# Patient Record
Sex: FEMALE | Race: WHITE | NOT HISPANIC OR LATINO | Employment: STUDENT | URBAN - METROPOLITAN AREA
[De-identification: names, ages, dates, MRNs, and addresses within clinical notes are randomized per-mention and may not be internally consistent; named-entity substitution may affect disease eponyms.]

---
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
## 2024-02-05 ENCOUNTER — APPOINTMENT (OUTPATIENT)
Dept: FAMILY MEDICINE | Age: 19
End: 2024-02-05

## 2024-02-05 DIAGNOSIS — L70.0 CYSTIC ACNE: ICD-10-CM

## 2024-02-05 DIAGNOSIS — F90.0 ADHD, PREDOMINANTLY INATTENTIVE TYPE: ICD-10-CM

## 2024-02-05 DIAGNOSIS — F41.1 GENERALIZED ANXIETY DISORDER: Primary | ICD-10-CM

## 2024-02-05 PROCEDURE — 99213 OFFICE O/P EST LOW 20 MIN: CPT | Performed by: FAMILY MEDICINE

## 2024-02-05 RX ORDER — DEXTROAMPHETAMINE SACCHARATE, AMPHETAMINE ASPARTATE, DEXTROAMPHETAMINE SULFATE AND AMPHETAMINE SULFATE 5; 5; 5; 5 MG/1; MG/1; MG/1; MG/1
20 TABLET ORAL DAILY
Qty: 30 TABLET | Refills: 0 | Status: SHIPPED | OUTPATIENT
Start: 2024-02-05

## 2024-02-05 RX ORDER — CLASCOTERONE 1 G/100G
CREAM TOPICAL
Qty: 60 G | Refills: 1 | Status: SHIPPED | OUTPATIENT
Start: 2024-02-05

## 2024-02-05 ASSESSMENT — ENCOUNTER SYMPTOMS
POLYPHAGIA: 0
NAUSEA: 0
SORE THROAT: 0
STRIDOR: 0
COUGH: 0
FATIGUE: 0
WHEEZING: 0
DIAPHORESIS: 0
EYE DISCHARGE: 0
TROUBLE SWALLOWING: 0
CHILLS: 0
SHORTNESS OF BREATH: 0
CONSTIPATION: 0
DIARRHEA: 0
DIZZINESS: 0
EYE PAIN: 0
POLYDIPSIA: 0
BLOOD IN STOOL: 0
ABDOMINAL DISTENTION: 0
WEAKNESS: 0
ABDOMINAL PAIN: 0
VOMITING: 0
ACTIVITY CHANGE: 0
HEADACHES: 0
RHINORRHEA: 0
PSYCHIATRIC NEGATIVE: 1
FEVER: 0

## 2024-03-11 ENCOUNTER — APPOINTMENT (OUTPATIENT)
Dept: FAMILY MEDICINE | Age: 19
End: 2024-03-11

## 2024-03-11 DIAGNOSIS — F90.0 ADHD, PREDOMINANTLY INATTENTIVE TYPE: Primary | ICD-10-CM

## 2024-03-11 PROCEDURE — 99213 OFFICE O/P EST LOW 20 MIN: CPT | Performed by: FAMILY MEDICINE

## 2024-03-11 RX ORDER — DEXTROAMPHETAMINE SACCHARATE, AMPHETAMINE ASPARTATE, DEXTROAMPHETAMINE SULFATE AND AMPHETAMINE SULFATE 5; 5; 5; 5 MG/1; MG/1; MG/1; MG/1
20 TABLET ORAL 2 TIMES DAILY
Qty: 45 TABLET | Refills: 0 | Status: SHIPPED | OUTPATIENT
Start: 2024-03-11

## 2024-03-11 ASSESSMENT — ENCOUNTER SYMPTOMS
CHILLS: 0
CONSTIPATION: 0
NAUSEA: 0
FEVER: 0
FATIGUE: 0
VOMITING: 0
DIARRHEA: 0
RHINORRHEA: 0
COUGH: 0
HEADACHES: 0
BLOOD IN STOOL: 0
WHEEZING: 0
EYE DISCHARGE: 0
ACTIVITY CHANGE: 0
STRIDOR: 0
EYE PAIN: 0
SORE THROAT: 0
ABDOMINAL PAIN: 0
SHORTNESS OF BREATH: 0
POLYDIPSIA: 0
TROUBLE SWALLOWING: 0
DIAPHORESIS: 0
POLYPHAGIA: 0
WEAKNESS: 0
ABDOMINAL DISTENTION: 0
DIZZINESS: 0
PSYCHIATRIC NEGATIVE: 1

## 2024-04-09 ENCOUNTER — TELEPHONE (OUTPATIENT)
Dept: FAMILY MEDICINE | Age: 19
End: 2024-04-09

## 2024-04-09 DIAGNOSIS — F90.0 ADHD, PREDOMINANTLY INATTENTIVE TYPE: ICD-10-CM

## 2024-04-09 RX ORDER — DEXTROAMPHETAMINE SACCHARATE, AMPHETAMINE ASPARTATE, DEXTROAMPHETAMINE SULFATE AND AMPHETAMINE SULFATE 5; 5; 5; 5 MG/1; MG/1; MG/1; MG/1
20 TABLET ORAL 2 TIMES DAILY
Qty: 45 TABLET | Refills: 0 | Status: SHIPPED | OUTPATIENT
Start: 2024-04-09

## 2024-08-07 ENCOUNTER — OFFICE VISIT (OUTPATIENT)
Dept: ORTHOPEDIC SURGERY | Facility: CLINIC | Age: 19
End: 2024-08-07
Payer: COMMERCIAL

## 2024-08-07 ENCOUNTER — HOSPITAL ENCOUNTER (OUTPATIENT)
Dept: RADIOLOGY | Facility: CLINIC | Age: 19
Discharge: HOME | End: 2024-08-07
Payer: COMMERCIAL

## 2024-08-07 ENCOUNTER — OFFICE VISIT (OUTPATIENT)
Dept: ORTHOPEDIC SURGERY | Facility: HOSPITAL | Age: 19
End: 2024-08-07
Payer: COMMERCIAL

## 2024-08-07 VITALS — HEIGHT: 62 IN | WEIGHT: 155 LBS | BODY MASS INDEX: 28.52 KG/M2

## 2024-08-07 DIAGNOSIS — M25.551 RIGHT HIP PAIN: ICD-10-CM

## 2024-08-07 DIAGNOSIS — Q65.89 OTHER SPECIFIED CONGENITAL DEFORMITIES OF HIP (HHS-HCC): ICD-10-CM

## 2024-08-07 DIAGNOSIS — M25.851 FEMOROACETABULAR IMPINGEMENT OF RIGHT HIP: ICD-10-CM

## 2024-08-07 DIAGNOSIS — M24.151 DEGENERATIVE TEAR OF ACETABULAR LABRUM OF RIGHT HIP: ICD-10-CM

## 2024-08-07 DIAGNOSIS — Q65.89 OTHER SPECIFIED CONGENITAL DEFORMITIES OF HIP (HHS-HCC): Primary | ICD-10-CM

## 2024-08-07 DIAGNOSIS — Q65.89 FEMORAL ANTEVERSION OF RIGHT LOWER EXTREMITY (HHS-HCC): ICD-10-CM

## 2024-08-07 PROCEDURE — 73502 X-RAY EXAM HIP UNI 2-3 VIEWS: CPT | Mod: RT

## 2024-08-07 PROCEDURE — 73552 X-RAY EXAM OF FEMUR 2/>: CPT | Mod: RT

## 2024-08-07 PROCEDURE — 99214 OFFICE O/P EST MOD 30 MIN: CPT | Performed by: ORTHOPAEDIC SURGERY

## 2024-08-07 PROCEDURE — 99213 OFFICE O/P EST LOW 20 MIN: CPT | Performed by: ORTHOPAEDIC SURGERY

## 2024-08-07 PROCEDURE — 72190 X-RAY EXAM OF PELVIS: CPT

## 2024-08-07 PROCEDURE — 3008F BODY MASS INDEX DOCD: CPT | Performed by: ORTHOPAEDIC SURGERY

## 2024-08-07 RX ORDER — DEXTROAMPHETAMINE SACCHARATE, AMPHETAMINE ASPARTATE, DEXTROAMPHETAMINE SULFATE AND AMPHETAMINE SULFATE 5; 5; 5; 5 MG/1; MG/1; MG/1; MG/1
30 TABLET ORAL
COMMUNITY
Start: 2024-05-07 | End: 2024-08-08

## 2024-08-07 RX ORDER — CEFUROXIME AXETIL 250 MG/1
6 TABLET ORAL
COMMUNITY
Start: 2024-03-29

## 2024-08-07 RX ORDER — ERENUMAB-AOOE 140 MG/ML
140 INJECTION, SOLUTION SUBCUTANEOUS
COMMUNITY
Start: 2024-03-29

## 2024-08-07 ASSESSMENT — PAIN - FUNCTIONAL ASSESSMENT: PAIN_FUNCTIONAL_ASSESSMENT: NO/DENIES PAIN

## 2024-08-12 NOTE — PROGRESS NOTES
Patient returns to see me today in follow-up of her hip.  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  She has had now by her account at least 3 intra-articular injections but continues to have significant debilitating pain she is also done extensive physical therapy she is interested in proceeding with operative intervention in the form of a derotational osteotomy of the femur combined with a hip arthroscopy to treat her labral tear and loose body as well as impingement.    Patient has exhausted conservative management including therapeutic exercises, activity modification, NSAIDS.  They continue to have worsening deep groin pain with mechanical symptoms affecting ADLs.  Patient would like to proceed with surgery entailing a hip arthroscopy, acetabuloplasty for acetabular retroversion, labral repair, femoral osteoplasty, loose body removal for possible cartilaginous loose body seen on MRI, and capsular plication for mild hip instability.    We discussed the risks and benefits of surgery which included but were not limited to bleeding, infection, damage to nerves, damage to blood vessels, need for further procedures, risks of anesthesia, heterotopic ossification, femoral neck stress fracture, avascular necrosis of the femoral head, pudendal nerve palsy iatrogenic instability progression of osteoarthritis.    Patient was prescribed a hinged hip brace for KENDALL/labral tear.  The patient is ambulatory with or without aid; but, has weakness, instability and/or deformity of their right hip which requires stabilization from this orthosis to improve their function.      Verbal and written instructions for the use, wear schedule, cleaning and application of this item were given.  Patient was instructed that should the brace result in increased pain, decreased sensation, increased swelling, or an overall  worsening of their medical condition, to please contact our office immediately.     Patient was prescribed crutches for KENDALL/labral tear.  This mobility device is required for the following reasons:    1. The patient has a mobility limitation that significantly impairs their ability to participate in one or more mobility-related activities of daily living (MRADL) in the home; and  2. The patient is able to safely use the mobility device; and  3. The functional mobility deficit can be sufficiently resolved with use of the mobility device    Verbal and written instructions for the use, wear schedule, cleaning and application of this item were given.  Education provided also included gait training and safety precautions when using this device. Patient was instructed that should the item result in increased pain, decreased sensation, increased swelling, or an overall worsening of their medical condition, to please contact our office immediately.    Orthotic management and training was provided for skin care, modifications due to healing tissues, edema changes, interruption in skin integrity, and safety precautions with the orthosis.

## 2024-08-12 NOTE — PROGRESS NOTES
19-year-old female who I last saw about 2-1/2 years ago.  She presents with her mother today.  She has had persistent right sided pain since I saw her last that was initially tolerable but as her activities have increased in college as she is still  she has had progressive pain that has not abated fully.  It is now affecting her daily living and daily activities.  She has pain not only with activity but also now with rest.  She was previously discussed to have a derotational femoral osteotomy and hip arthroscopy combination procedure on the right side for increased femoral anteversion and KENDALL.  She continues to have mostly groin pain that sometimes radiates posteriorly.    The patients full medical history, surgical history, medications, allergies, family, medical history, social history, and a complete 30 point review of systems is documented in the medical record on the signed, scanned medical intake sheet or reviewed in the history of present illness.    Gen: The patient is alert and oriented ×3, is in no acute distress, and appear their stated age and weight.    Psychiatric: Mood and affect are appropriate.    Eyes: Sclera are white, and pupils are round and symmetric.    ENT: Mucous membranes are moist.     Neck: Supple. Thyroid is midline.    Respiratory: Respirations are nonlabored, chest rise is symmetric.    Cardiac: Rate is regular by palpation of distal pulses.     Abdomen: Nondistended.    Integument: No obvious cutaneous lesions are noted. No signs of lymphangitis. No signs of systemic edema.    Gait and stance examination demonstrate a reciprocal heel toe gait. Trendelenburg sign is negative.    Right hip examination reveals 120 degrees of flexion, 20 degrees of internal rotation, 20 degrees of external rotation, and 50 degrees of abduction. Anterior impingement sign is positive.  Posterior impingement sign is negative. Subspine impingement sign is negative. DWAIN test is negative.  Stinchfield test is positive. Vidhi test is negative. There is no tenderness to palpation over the pubic symphysis, anterior groin, greater trochanter, or gluteal region. Posterior apprehension is negative. Anterior apprehension is negative. Hip flexion strength is 5 out of 5. Adductor strength is 5 out of 5. Abduction strength is 5 out of 5 in the lateral position. Pelvic obliquity is level.    Distally, ankle dorsiflexion and plantarflexion as well as great toe extension is 5 out of 5. Sensation is intact to light touch in the tibial, sural, saphenous, superficial peroneal, and deep peroneal nerve distributions. The foot is warm and well-perfused with palpable pedal pulses. There is no obvious edema present. Skin is warm, dry and intact.    19-year-old female with increased femoral anteversion and femoral acetabular impingement on the right hip likely with labral tear.  She has not had any updated advanced imaging.  I will order a right sided CT scan of her femur as well as a right hip MRI.  She will have to get this outsourced at [facility] due to her parents insurance.  She will work on getting approval for that, I been in contact with her father regarding this.  Once these tests are done we should see her back in the office to review them and discuss degree of correction but she would be a candidate for likely a 20 degree derotational femoral osteotomy external rotation and hip arthroscopy.  She will also see Dr. Mcneal today to confirm this.  We will discuss further following the completion of her advanced imaging.    Natural History reviewed. All questions answered. The patient was in agreement with the plan.      **This note was created using voice recognition software and was not corrected for typographical or grammatical errors.**

## 2024-09-16 ENCOUNTER — TELEPHONE (OUTPATIENT)
Dept: FAMILY MEDICINE | Age: 19
End: 2024-09-16

## 2024-09-16 DIAGNOSIS — F90.0 ADHD, PREDOMINANTLY INATTENTIVE TYPE: ICD-10-CM

## 2024-09-16 DIAGNOSIS — L70.0 CYSTIC ACNE: ICD-10-CM

## 2024-09-16 DIAGNOSIS — J45.30 MILD PERSISTENT ASTHMA WITHOUT COMPLICATION (CMD): Primary | ICD-10-CM

## 2024-09-16 RX ORDER — ADAPALENE 0.1 G/100G
CREAM TOPICAL NIGHTLY
Qty: 45 G | Refills: 5 | Status: SHIPPED | OUTPATIENT
Start: 2024-09-16

## 2024-09-16 RX ORDER — LEVALBUTEROL TARTRATE 45 UG/1
1-2 AEROSOL, METERED ORAL EVERY 4 HOURS PRN
Qty: 1 EACH | Refills: 5 | Status: SHIPPED | OUTPATIENT
Start: 2024-09-16

## 2024-09-16 RX ORDER — DEXTROAMPHETAMINE SACCHARATE, AMPHETAMINE ASPARTATE, DEXTROAMPHETAMINE SULFATE AND AMPHETAMINE SULFATE 5; 5; 5; 5 MG/1; MG/1; MG/1; MG/1
20 TABLET ORAL 2 TIMES DAILY
Qty: 45 TABLET | Refills: 0 | Status: SHIPPED | OUTPATIENT
Start: 2024-09-16

## 2024-09-16 RX ORDER — CLASCOTERONE 1 G/100G
CREAM TOPICAL
Qty: 60 G | Refills: 5 | Status: SHIPPED | OUTPATIENT
Start: 2024-09-16

## 2024-09-19 ENCOUNTER — V-VISIT (OUTPATIENT)
Dept: FAMILY MEDICINE | Age: 19
End: 2024-09-19

## 2024-09-19 DIAGNOSIS — L70.0 CYSTIC ACNE: ICD-10-CM

## 2024-09-19 DIAGNOSIS — F41.1 GENERALIZED ANXIETY DISORDER: Primary | ICD-10-CM

## 2024-09-19 DIAGNOSIS — F90.0 ADHD, PREDOMINANTLY INATTENTIVE TYPE: ICD-10-CM

## 2024-09-19 PROCEDURE — 99213 OFFICE O/P EST LOW 20 MIN: CPT | Performed by: FAMILY MEDICINE

## 2024-09-19 SDOH — ECONOMIC STABILITY: GENERAL: WOULD YOU LIKE HELP WITH ANY OF THE FOLLOWING NEEDS?: I DON'T WANT HELP WITH ANY OF THESE

## 2024-09-19 SDOH — ECONOMIC STABILITY: TRANSPORTATION INSECURITY
IN THE PAST 12 MONTHS, HAS LACK OF RELIABLE TRANSPORTATION KEPT YOU FROM MEDICAL APPOINTMENTS, MEETINGS, WORK OR FROM GETTING THINGS NEEDED FOR DAILY LIVING?: NO

## 2024-09-19 SDOH — ECONOMIC STABILITY: HOUSING INSECURITY: DO YOU HAVE PROBLEMS WITH ANY OF THE FOLLOWING?: NONE OF THE ABOVE

## 2024-09-19 SDOH — ECONOMIC STABILITY: FOOD INSECURITY: WITHIN THE PAST 12 MONTHS, THE FOOD YOU BOUGHT JUST DIDN'T LAST AND YOU DIDN'T HAVE MONEY TO GET MORE.: NEVER TRUE

## 2024-09-19 SDOH — ECONOMIC STABILITY: HOUSING INSECURITY: WHAT IS YOUR LIVING SITUATION TODAY?: I HAVE A STEADY PLACE TO LIVE

## 2024-09-19 ASSESSMENT — ENCOUNTER SYMPTOMS
ABDOMINAL DISTENTION: 0
EYE PAIN: 0
WEAKNESS: 0
EYE DISCHARGE: 0
SHORTNESS OF BREATH: 0
VOMITING: 0
RHINORRHEA: 0
ACTIVITY CHANGE: 0
NAUSEA: 0
FATIGUE: 0
TROUBLE SWALLOWING: 0
FEVER: 0
POLYDIPSIA: 0
DIAPHORESIS: 0
CONSTIPATION: 0
SORE THROAT: 0
WHEEZING: 0
COUGH: 0
HEADACHES: 0
STRIDOR: 0
DIARRHEA: 0
PSYCHIATRIC NEGATIVE: 1
CHILLS: 0
ABDOMINAL PAIN: 0
BLOOD IN STOOL: 0
DIZZINESS: 0
POLYPHAGIA: 0

## 2024-09-19 ASSESSMENT — SOCIAL DETERMINANTS OF HEALTH (SDOH): IN THE PAST 12 MONTHS, HAS THE ELECTRIC, GAS, OIL, OR WATER COMPANY THREATENED TO SHUT OFF SERVICE IN YOUR HOME?: NO

## 2024-10-03 ENCOUNTER — TELEPHONE (OUTPATIENT)
Dept: ORTHOPEDIC SURGERY | Facility: CLINIC | Age: 19
End: 2024-10-03
Payer: COMMERCIAL

## 2024-10-23 ENCOUNTER — TELEPHONE (OUTPATIENT)
Dept: FAMILY MEDICINE | Age: 19
End: 2024-10-23

## 2024-10-23 DIAGNOSIS — F90.0 ADHD, PREDOMINANTLY INATTENTIVE TYPE: ICD-10-CM

## 2024-10-23 RX ORDER — DEXTROAMPHETAMINE SACCHARATE, AMPHETAMINE ASPARTATE, DEXTROAMPHETAMINE SULFATE AND AMPHETAMINE SULFATE 5; 5; 5; 5 MG/1; MG/1; MG/1; MG/1
20 TABLET ORAL 2 TIMES DAILY
Qty: 45 TABLET | Refills: 0 | Status: SHIPPED | OUTPATIENT
Start: 2024-10-23

## 2024-11-27 ENCOUNTER — OFFICE VISIT (OUTPATIENT)
Dept: ORTHOPEDIC SURGERY | Facility: CLINIC | Age: 19
End: 2024-11-27
Payer: COMMERCIAL

## 2024-11-27 ENCOUNTER — HOSPITAL ENCOUNTER (OUTPATIENT)
Dept: RADIOLOGY | Facility: CLINIC | Age: 19
Discharge: HOME | End: 2024-11-27
Payer: COMMERCIAL

## 2024-11-27 DIAGNOSIS — Q65.89 OTHER SPECIFIED CONGENITAL DEFORMITIES OF HIP: Primary | ICD-10-CM

## 2024-11-27 DIAGNOSIS — Q65.89 OTHER SPECIFIED CONGENITAL DEFORMITIES OF HIP: ICD-10-CM

## 2024-11-27 PROCEDURE — 99214 OFFICE O/P EST MOD 30 MIN: CPT | Performed by: ORTHOPAEDIC SURGERY

## 2024-11-27 ASSESSMENT — PAIN - FUNCTIONAL ASSESSMENT: PAIN_FUNCTIONAL_ASSESSMENT: NO/DENIES PAIN

## 2024-11-30 NOTE — PROGRESS NOTES
19-year-old female presenting in follow-up who presents with her parents.  I saw her last in August.  She also saw Dr. Mcneal at that point.  She is presenting today for follow-up regarding new imaging in terms of CT scan of her femur and MRI of her hip.  She has a known diagnosis of increased femoral anteversion.  She continues to have groin pain.  She is living in [place] at the moment for school.  Pain is only really exacerbated by profound activity modification sometimes for days.  She continues to have pain with activity and prolonged walking.    The patient does not endorse fevers and chills. The patient does not endorse any change in her vision or hearing. They do not endorse chest pain, shortness of breath. The patient does not endorse any abdominal discomfort. They do not endorse any skin irritation or lesions. They do not endorse any new numbness and tingling or as otherwise stated in the history of present illness.    She is in no acute distress, alert and oriented x 3.    Mood and affect are appropriate.    Respirations are unlabored.    Distal limb is pink and well perfused.    Right lower extremity evaluation demonstrates 60 to 70 degrees of internal rotation and 20 degrees of external rotation.  She has pain with stressing of her anterior capsule anteriorly.  She has anterior impingement test positivity.  Sensation is intact to light touch in the tibial, sural, saphenous, superficial peroneal, and deep peroneal nerve distributions. Foot is warm and well-perfused.    CT scan demonstrates 45 degrees of femoral anteversion on the right.  MRI demonstrates a labral tear.    19-year-old female who be a good candidate for a combined femoral derotation osteotomy as well as hip arthroscopy.  Will plan for surgery on 5/12/2025 at [facility].  We had a detailed discussion regarding the full details of the procedure, the proposed surgical plan, the cutting of the femur and derotating it using  external fixator assistance. We discussed combined arthroscopic and open approach with Dr. Mcneal for decompression of her femoral head with osteochondroplasty and repair of her labrum. We discussed regional anesthesia block combined with general anesthesia. We discussed use of hypotensive anesthesia to minimize blood loss but that blood allogenic blood transfusion is still a possibility. We discussed 2-3 day inpatient hospital stay. Although a traditional hip arthroscopy would be done outpatient, the complex level of hospital and nursing care needed would necessitate an inpatient stay, including frequent vital checks, monitoring and treatment of hypotension and anemia, IV pain medication, medical co-management, and safety education of ADLs while on crutches with limited weight bearing.  We also discussed weightbearing and crutch use based on the limitations of the arthroscopy and not be osteotomy. We discussed that patient will need a wheelchair for short distances possibly for up to 3 months and long distances up to 6 months depending on fatigue and recovery level. The natural history of dysplasia as well as details surrounding the procedure and postoperative recovery course were discussed with the patient and family present.  Nonoperative and operative treatment options were presented to the patient. After discussion, operative treatment was elected. Risks and benefits of surgery were discussed with the patient which include, but are not limited to, death, infection, bleeding, neurologic damage, nonunion, malunion, posttraumatic arthritis, incomplete resolution of symptoms, failure of the operation, and others. The patient understood and elected to proceed.    Natural History reviewed. All questions answered. The patient was in agreement with the plan.      **This note was created using voice recognition software and was not corrected for typographical or grammatical errors.**

## 2024-12-03 DIAGNOSIS — M25.051 HEMARTHROSIS OF RIGHT HIP: ICD-10-CM

## 2024-12-03 DIAGNOSIS — M25.851 FEMOROACETABULAR IMPINGEMENT OF RIGHT HIP: ICD-10-CM

## 2024-12-03 DIAGNOSIS — S73.191A TEAR OF RIGHT ACETABULAR LABRUM, INITIAL ENCOUNTER: ICD-10-CM

## 2024-12-03 DIAGNOSIS — Q65.89 CONGENITAL HIP DYSPLASIA (HHS-HCC): ICD-10-CM

## 2024-12-05 ENCOUNTER — TELEPHONE (OUTPATIENT)
Dept: FAMILY MEDICINE | Age: 19
End: 2024-12-05

## 2024-12-05 DIAGNOSIS — F90.0 ADHD, PREDOMINANTLY INATTENTIVE TYPE: ICD-10-CM

## 2024-12-05 RX ORDER — DEXTROAMPHETAMINE SACCHARATE, AMPHETAMINE ASPARTATE, DEXTROAMPHETAMINE SULFATE AND AMPHETAMINE SULFATE 5; 5; 5; 5 MG/1; MG/1; MG/1; MG/1
20 TABLET ORAL 2 TIMES DAILY
Qty: 45 TABLET | Refills: 0 | Status: SHIPPED | OUTPATIENT
Start: 2024-12-05

## 2025-01-08 ENCOUNTER — OFFICE VISIT (OUTPATIENT)
Dept: ORTHOPEDIC SURGERY | Facility: HOSPITAL | Age: 20
End: 2025-01-08
Payer: COMMERCIAL

## 2025-01-08 DIAGNOSIS — M25.851 FEMOROACETABULAR IMPINGEMENT OF RIGHT HIP: ICD-10-CM

## 2025-01-08 DIAGNOSIS — S73.191A ACETABULAR LABRUM TEAR, RIGHT, INITIAL ENCOUNTER: ICD-10-CM

## 2025-01-08 DIAGNOSIS — Q65.89 FEMORAL ANTEVERSION OF RIGHT LOWER EXTREMITY (HHS-HCC): ICD-10-CM

## 2025-01-08 PROCEDURE — 99213 OFFICE O/P EST LOW 20 MIN: CPT | Performed by: ORTHOPAEDIC SURGERY

## 2025-01-08 NOTE — PROGRESS NOTES
Patient returns today in follow-up of her hip.  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  She has had now by her account at least 3 intra-articular injections but continues to have significant debilitating pain she is also done extensive physical therapy she is interested in proceeding with operative intervention in the form of a derotational osteotomy of the femur combined with a hip arthroscopy to treat her labral tear and loose body as well as impingement. She is here with her mother today. She has not done any recent PT.     We will get her a new script for PT.     This surgery will be out of network for the patient. They are working on a waiver from [employer].   If out of network, we will only bill [id number] and [id number].    Patient has exhausted conservative management including therapeutic exercises, activity modification, NSAIDS.  They continue to have worsening deep groin pain with mechanical symptoms affecting ADLs.  Patient would like to proceed with surgery entailing a hip arthroscopy, acetabuloplasty for acetabular retroversion, labral repair, femoral osteoplasty, loose body removal for possible cartilaginous loose body seen on MRI, and capsular plication for mild hip instability.    We discussed the risks and benefits of surgery which included but were not limited to bleeding, infection, damage to nerves, damage to blood vessels, need for further procedures, risks of anesthesia, heterotopic ossification, femoral neck stress fracture, avascular necrosis of the femoral head, pudendal nerve palsy iatrogenic instability progression of osteoarthritis.    Patient was prescribed a hinged hip brace for KENDALL/labral tear.  The patient is ambulatory with or without aid; but, has weakness, instability and/or deformity of their right hip which requires stabilization from this orthosis to improve their  function.      Verbal and written instructions for the use, wear schedule, cleaning and application of this item were given.  Patient was instructed that should the brace result in increased pain, decreased sensation, increased swelling, or an overall worsening of their medical condition, to please contact our office immediately.     Patient was prescribed crutches for KENDALL/labral tear.  This mobility device is required for the following reasons:    1. The patient has a mobility limitation that significantly impairs their ability to participate in one or more mobility-related activities of daily living (MRADL) in the home; and  2. The patient is able to safely use the mobility device; and  3. The functional mobility deficit can be sufficiently resolved with use of the mobility device    Verbal and written instructions for the use, wear schedule, cleaning and application of this item were given.  Education provided also included gait training and safety precautions when using this device. Patient was instructed that should the item result in increased pain, decreased sensation, increased swelling, or an overall worsening of their medical condition, to please contact our office immediately.    Orthotic management and training was provided for skin care, modifications due to healing tissues, edema changes, interruption in skin integrity, and safety precautions with the orthosis.         Antonio Herbert MD  Sports Medicine Fellow  Orthopaedic Surgery

## 2025-01-14 ENCOUNTER — TELEPHONE (OUTPATIENT)
Dept: FAMILY MEDICINE | Age: 20
End: 2025-01-14

## 2025-01-14 ENCOUNTER — PATIENT MESSAGE (OUTPATIENT)
Dept: ORTHOPEDIC SURGERY | Facility: CLINIC | Age: 20
End: 2025-01-14
Payer: COMMERCIAL

## 2025-01-14 DIAGNOSIS — F90.0 ADHD, PREDOMINANTLY INATTENTIVE TYPE: ICD-10-CM

## 2025-01-14 RX ORDER — DEXTROAMPHETAMINE SACCHARATE, AMPHETAMINE ASPARTATE, DEXTROAMPHETAMINE SULFATE AND AMPHETAMINE SULFATE 5; 5; 5; 5 MG/1; MG/1; MG/1; MG/1
20 TABLET ORAL 2 TIMES DAILY
Qty: 45 TABLET | Refills: 0 | Status: SHIPPED | OUTPATIENT
Start: 2025-01-14

## 2025-02-24 ENCOUNTER — TELEPHONE (OUTPATIENT)
Dept: FAMILY MEDICINE | Age: 20
End: 2025-02-24

## 2025-02-24 DIAGNOSIS — F90.0 ADHD, PREDOMINANTLY INATTENTIVE TYPE: ICD-10-CM

## 2025-02-24 DIAGNOSIS — N92.1 MENORRHAGIA WITH IRREGULAR CYCLE: Primary | ICD-10-CM

## 2025-02-24 RX ORDER — DEXTROAMPHETAMINE SACCHARATE, AMPHETAMINE ASPARTATE, DEXTROAMPHETAMINE SULFATE AND AMPHETAMINE SULFATE 5; 5; 5; 5 MG/1; MG/1; MG/1; MG/1
20 TABLET ORAL 2 TIMES DAILY
Qty: 45 TABLET | Refills: 0 | Status: SHIPPED | OUTPATIENT
Start: 2025-02-24

## 2025-02-25 ENCOUNTER — LAB SERVICES (OUTPATIENT)
Dept: LAB | Age: 20
End: 2025-02-25

## 2025-02-25 DIAGNOSIS — N92.1 MENORRHAGIA WITH IRREGULAR CYCLE: ICD-10-CM

## 2025-02-25 PROCEDURE — 85025 COMPLETE CBC W/AUTO DIFF WBC: CPT | Performed by: CLINICAL MEDICAL LABORATORY

## 2025-02-25 PROCEDURE — 36415 COLL VENOUS BLD VENIPUNCTURE: CPT | Performed by: FAMILY MEDICINE

## 2025-02-25 PROCEDURE — 84443 ASSAY THYROID STIM HORMONE: CPT | Performed by: CLINICAL MEDICAL LABORATORY

## 2025-02-26 LAB
BASOPHILS # BLD: 0 K/MCL (ref 0–0.3)
BASOPHILS NFR BLD: 0 %
DEPRECATED RDW RBC: 39.9 FL (ref 39–50)
EOSINOPHIL # BLD: 0.1 K/MCL (ref 0–0.5)
EOSINOPHIL NFR BLD: 1 %
ERYTHROCYTE [DISTWIDTH] IN BLOOD: 12.2 % (ref 11–15)
HCT VFR BLD CALC: 42.5 % (ref 36–46.5)
HGB BLD-MCNC: 14.5 G/DL (ref 12–15.5)
IMM GRANULOCYTES # BLD AUTO: 0 K/MCL (ref 0–0.2)
IMM GRANULOCYTES # BLD: 0 %
LYMPHOCYTES # BLD: 1.9 K/MCL (ref 1.2–5.2)
LYMPHOCYTES NFR BLD: 22 %
MCH RBC QN AUTO: 30.2 PG (ref 26–34)
MCHC RBC AUTO-ENTMCNC: 34.1 G/DL (ref 32–36.5)
MCV RBC AUTO: 88.5 FL (ref 78–100)
MONOCYTES # BLD: 0.7 K/MCL (ref 0.3–0.9)
MONOCYTES NFR BLD: 7 %
NEUTROPHILS # BLD: 6.3 K/MCL (ref 1.8–8)
NEUTROPHILS NFR BLD: 70 %
NRBC BLD MANUAL-RTO: 0 /100 WBC
PLATELET # BLD AUTO: 309 K/MCL (ref 140–450)
RBC # BLD: 4.8 MIL/MCL (ref 4–5.2)
TSH SERPL-ACNC: 0.97 MCUNITS/ML (ref 0.46–4.13)
WBC # BLD: 9 K/MCL (ref 4.2–11)

## 2025-03-20 ENCOUNTER — TELEPHONE (OUTPATIENT)
Dept: FAMILY MEDICINE | Age: 20
End: 2025-03-20

## 2025-04-04 ENCOUNTER — E-ADVICE (OUTPATIENT)
Dept: FAMILY MEDICINE | Age: 20
End: 2025-04-04

## 2025-04-22 ENCOUNTER — ANESTHESIA EVENT (OUTPATIENT)
Dept: OPERATING ROOM | Facility: HOSPITAL | Age: 20
DRG: 481 | End: 2025-04-22
Payer: COMMERCIAL

## 2025-04-22 DIAGNOSIS — F90.0 ADHD, PREDOMINANTLY INATTENTIVE TYPE: ICD-10-CM

## 2025-04-22 RX ORDER — DEXTROAMPHETAMINE SACCHARATE, AMPHETAMINE ASPARTATE, DEXTROAMPHETAMINE SULFATE AND AMPHETAMINE SULFATE 5; 5; 5; 5 MG/1; MG/1; MG/1; MG/1
20 TABLET ORAL 2 TIMES DAILY
Qty: 45 TABLET | Refills: 0 | Status: SHIPPED | OUTPATIENT
Start: 2025-04-22

## 2025-04-23 DIAGNOSIS — M25.851 FEMOROACETABULAR IMPINGEMENT OF RIGHT HIP: ICD-10-CM

## 2025-04-23 DIAGNOSIS — Q65.89 OTHER SPECIFIED CONGENITAL DEFORMITIES OF HIP: ICD-10-CM

## 2025-04-23 DIAGNOSIS — S73.191A ACETABULAR LABRUM TEAR, RIGHT, INITIAL ENCOUNTER: ICD-10-CM

## 2025-04-23 DIAGNOSIS — Q65.89 FEMORAL ANTEVERSION OF RIGHT LOWER EXTREMITY: ICD-10-CM

## 2025-05-05 ENCOUNTER — LAB REQUISITION (OUTPATIENT)
Dept: LAB | Facility: HOSPITAL | Age: 20
End: 2025-05-05
Payer: COMMERCIAL

## 2025-05-05 ENCOUNTER — LAB (OUTPATIENT)
Dept: LAB | Facility: HOSPITAL | Age: 20
End: 2025-05-05
Payer: COMMERCIAL

## 2025-05-05 ENCOUNTER — PRE-ADMISSION TESTING (OUTPATIENT)
Dept: PREADMISSION TESTING | Facility: HOSPITAL | Age: 20
End: 2025-05-05
Payer: COMMERCIAL

## 2025-05-05 VITALS
HEART RATE: 80 BPM | TEMPERATURE: 98.8 F | BODY MASS INDEX: 26.21 KG/M2 | OXYGEN SATURATION: 99 % | DIASTOLIC BLOOD PRESSURE: 72 MMHG | RESPIRATION RATE: 16 BRPM | SYSTOLIC BLOOD PRESSURE: 114 MMHG | WEIGHT: 142.42 LBS | HEIGHT: 62 IN

## 2025-05-05 DIAGNOSIS — Z01.818 ENCOUNTER FOR OTHER PREPROCEDURAL EXAMINATION: Primary | ICD-10-CM

## 2025-05-05 DIAGNOSIS — Z01.818 PREOPERATIVE TESTING: Primary | ICD-10-CM

## 2025-05-05 DIAGNOSIS — Z01.818 ENCOUNTER FOR OTHER PREPROCEDURAL EXAMINATION: ICD-10-CM

## 2025-05-05 DIAGNOSIS — Q65.89 CONGENITAL HIP DYSPLASIA (HHS-HCC): ICD-10-CM

## 2025-05-05 LAB
ABO GROUP (TYPE) IN BLOOD: NORMAL
ABO GROUP (TYPE) IN BLOOD: NORMAL
ALBUMIN SERPL BCP-MCNC: 4.5 G/DL (ref 3.4–5)
ALP SERPL-CCNC: 57 U/L (ref 33–110)
ALT SERPL W P-5'-P-CCNC: 6 U/L (ref 7–45)
ANION GAP SERPL CALC-SCNC: 12 MMOL/L (ref 10–20)
ANTIBODY SCREEN: NORMAL
AST SERPL W P-5'-P-CCNC: 13 U/L (ref 9–39)
BASOPHILS # BLD AUTO: 0.03 X10*3/UL (ref 0–0.1)
BASOPHILS NFR BLD AUTO: 0.4 %
BILIRUB SERPL-MCNC: 0.6 MG/DL (ref 0–1.2)
BUN SERPL-MCNC: 17 MG/DL (ref 6–23)
CALCIUM SERPL-MCNC: 9.7 MG/DL (ref 8.6–10.3)
CHLORIDE SERPL-SCNC: 103 MMOL/L (ref 98–107)
CO2 SERPL-SCNC: 28 MMOL/L (ref 21–32)
CREAT SERPL-MCNC: 0.99 MG/DL (ref 0.5–1.05)
EGFRCR SERPLBLD CKD-EPI 2021: 83 ML/MIN/1.73M*2
EOSINOPHIL # BLD AUTO: 0.19 X10*3/UL (ref 0–0.7)
EOSINOPHIL NFR BLD AUTO: 2.2 %
ERYTHROCYTE [DISTWIDTH] IN BLOOD BY AUTOMATED COUNT: 11.9 % (ref 11.5–14.5)
GLUCOSE SERPL-MCNC: 66 MG/DL (ref 74–99)
HCT VFR BLD AUTO: 40.7 % (ref 36–46)
HGB BLD-MCNC: 13.7 G/DL (ref 12–16)
IMM GRANULOCYTES # BLD AUTO: 0.02 X10*3/UL (ref 0–0.7)
IMM GRANULOCYTES NFR BLD AUTO: 0.2 % (ref 0–0.9)
LYMPHOCYTES # BLD AUTO: 1.97 X10*3/UL (ref 1.2–4.8)
LYMPHOCYTES NFR BLD AUTO: 23.2 %
MCH RBC QN AUTO: 29.7 PG (ref 26–34)
MCHC RBC AUTO-ENTMCNC: 33.7 G/DL (ref 32–36)
MCV RBC AUTO: 88 FL (ref 80–100)
MONOCYTES # BLD AUTO: 0.7 X10*3/UL (ref 0.1–1)
MONOCYTES NFR BLD AUTO: 8.2 %
NEUTROPHILS # BLD AUTO: 5.59 X10*3/UL (ref 1.2–7.7)
NEUTROPHILS NFR BLD AUTO: 65.8 %
NRBC BLD-RTO: NORMAL /100{WBCS}
PLATELET # BLD AUTO: 298 X10*3/UL (ref 150–450)
POTASSIUM SERPL-SCNC: 4.3 MMOL/L (ref 3.5–5.3)
PROT SERPL-MCNC: 6.8 G/DL (ref 6.4–8.2)
RBC # BLD AUTO: 4.62 X10*6/UL (ref 4–5.2)
RH FACTOR (ANTIGEN D): NORMAL
RH FACTOR (ANTIGEN D): NORMAL
SODIUM SERPL-SCNC: 139 MMOL/L (ref 136–145)
WBC # BLD AUTO: 8.5 X10*3/UL (ref 4.4–11.3)

## 2025-05-05 PROCEDURE — 86901 BLOOD TYPING SEROLOGIC RH(D): CPT

## 2025-05-05 PROCEDURE — 85025 COMPLETE CBC W/AUTO DIFF WBC: CPT

## 2025-05-05 PROCEDURE — 36415 COLL VENOUS BLD VENIPUNCTURE: CPT

## 2025-05-05 PROCEDURE — 80053 COMPREHEN METABOLIC PANEL: CPT

## 2025-05-05 PROCEDURE — 99204 OFFICE O/P NEW MOD 45 MIN: CPT

## 2025-05-05 PROCEDURE — 86850 RBC ANTIBODY SCREEN: CPT

## 2025-05-05 PROCEDURE — 86900 BLOOD TYPING SEROLOGIC ABO: CPT

## 2025-05-05 PROCEDURE — 87081 CULTURE SCREEN ONLY: CPT | Mod: BEALAB

## 2025-05-05 RX ORDER — IBUPROFEN 200 MG
600 TABLET ORAL DAILY PRN
COMMUNITY

## 2025-05-05 RX ORDER — CHLORHEXIDINE GLUCONATE ORAL RINSE 1.2 MG/ML
15 SOLUTION DENTAL DAILY
Qty: 30 ML | Refills: 0 | Status: SHIPPED | OUTPATIENT
Start: 2025-05-05 | End: 2025-05-14 | Stop reason: HOSPADM

## 2025-05-05 ASSESSMENT — DUKE ACTIVITY SCORE INDEX (DASI)
CAN YOU RUN A SHORT DISTANCE: YES
CAN YOU DO LIGHT WORK AROUND THE HOUSE LIKE DUSTING OR WASHING DISHES: YES
CAN YOU CLIMB A FLIGHT OF STAIRS OR WALK UP A HILL: YES
CAN YOU TAKE CARE OF YOURSELF (EAT, DRESS, BATHE, OR USE TOILET): YES
CAN YOU WALK INDOORS, SUCH AS AROUND YOUR HOUSE: YES
CAN YOU PARTICIPATE IN STRENOUS SPORTS LIKE SWIMMING, SINGLES TENNIS, FOOTBALL, BASKETBALL, OR SKIING: YES
CAN YOU DO YARD WORK LIKE RAKING LEAVES, WEEDING OR PUSHING A MOWER: YES
DASI METS SCORE: 9.9
CAN YOU DO MODERATE WORK AROUND THE HOUSE LIKE VACUUMING, SWEEPING FLOORS OR CARRYING GROCERIES: YES
CAN YOU WALK A BLOCK OR TWO ON LEVEL GROUND: YES
CAN YOU PARTICIPATE IN MODERATE RECREATIONAL ACTIVITIES LIKE GOLF, BOWLING, DANCING, DOUBLES TENNIS OR THROWING A BASEBALL OR FOOTBALL: YES
CAN YOU DO HEAVY WORK AROUND THE HOUSE LIKE SCRUBBING FLOORS OR LIFTING AND MOVING HEAVY FURNITURE: YES
TOTAL_SCORE: 58.2
CAN YOU HAVE SEXUAL RELATIONS: YES

## 2025-05-05 ASSESSMENT — PAIN - FUNCTIONAL ASSESSMENT: PAIN_FUNCTIONAL_ASSESSMENT: 0-10

## 2025-05-05 ASSESSMENT — PAIN SCALES - GENERAL: PAINLEVEL_OUTOF10: 6

## 2025-05-05 NOTE — PREPROCEDURE INSTRUCTIONS
Medication List            Accurate as of May 5, 2025  2:48 PM. Always use your most recent med list.                Aimovig Autoinjector 140 mg/mL injection  Generic drug: erenumab  Medication Adjustments for Surgery: Do Not take on the morning of surgery  Notes to patient: Last dose preoperatively 5/11/2025     amphetamine-dextroamphetamine 20 mg tablet  Commonly known as: Adderall  Medication Adjustments for Surgery: Do Not take on the morning of surgery  Notes to patient: Last dose preoperatively 5/11/2025     chlorhexidine 0.12 % solution  Commonly known as: Peridex  Use 15 mL in the mouth or throat once daily for 2 doses. 15 ML night before surgery and 15 ML morning of surgery. Swish and spit  Medication Adjustments for Surgery: Take/Use as prescribed     ibuprofen 200 mg tablet  Additional Medication Adjustments for Surgery: Take last dose 7 days before surgery  Notes to patient: Last dose preoperatively 5/4/2025     SUMAtriptan 6 mg/0.5 mL injection  Commonly known as: Imitrex  Medication Adjustments for Surgery: Do Not take on the morning of surgery  Notes to patient: Last dose preoperatively 5/11/2025            NPO Instructions:     Do not eat any food after midnight the night before your surgery/procedure.  You may have clear liquids until TWO hours before surgery/procedure. This includes water, black tea/coffee, (no milk or cream) apple juice and electrolyte drinks (Gatorade).  You may chew gum up to TWO hours before your surgery/procedure.     Additional Instructions:      Seven/Six Days before Surgery:  Review your medication instructions, stop indicated medications  Five Days before Surgery:  Review your medication instructions, stop indicated medications  Three Days before Surgery:  Review your medication instructions, stop indicated medications  The Day before Surgery:  Start using 0.12% CHG mouthwash  Begin using your Hibiclens  No smoking or alcohol use 24 hours before surgery  Review your  medication instructions, stop indicated medications  You will be contacted regarding the time of your arrival to facility and surgery time  Do not eat any food after Midnight  Day of Surgery:  Review your medication instructions, take indicated medications  If you have diabetes, please check your fasting blood sugar upon awakening.  If fasting blood sugar is <80 mg/dl, drink 100 ml of apple juice, time limit of 2 hours before  You may have clear liquids until TWO hours before surgery/procedure.  This includes water, black tea/coffee, (no milk or cream) apple juice and electrolyte drinks (Gatorade)  You may chew gum up to TWO hours before your surgery/procedure  Wear  comfortable loose fitting clothing  Do not use moisturizers, creams, lotions or perfume  All jewelry and valuables should be left at home     CONTACT SURGEON'S OFFICE IF YOU DEVELOP:  * Fever = 100.4 F   * New respiratory symptoms (e.g. cough, shortness of breath, respiratory distress, sore throat)  * Recent loss of taste or smell  *Flu like symptoms such as headache, fatigue or gastrointestinal symptoms  * You develop any open sores, shingles, burning or painful urination   AND/OR:  * You no longer wish to have the surgery.  * Any other personal circumstances change that may lead to the need to cancel or defer this surgery.  *You were admitted to any hospital within one week of your planned procedure.     SMOKING:  *Quitting smoking can make a huge difference to your health and recovery from surgery.    *If you need help with quitting, call 3-935-QUIT-NOW.     THE DAY BEFORE SURGERY:  *Do not eat any food after midnight the night before your surgery.   *You may have up to TEN OUNCES of clear liquids until TWO hours before your instructed ARRIVAL TIME to hospital. This includes water, black tea/coffee, (no milk or cream) apple juice, clear broth and electrolyte drinks (Gatorade). Please avoid clear liquids that are red in color.   *You may chew  gum/mints up to TWO hours before your surgery/procedure.     SURGICAL TIME:  *You will be contacted between 2 p.m. and 3 p.m. the business day before your surgery with your arrival time.  *If you haven't received a call by 3pm, call [phone]  *Scheduled surgery times may change and you will be notified if this occurs-check your personal voicemail for any updates.     ON THE MORNING OF SURGERY:  *Wear comfortable, loose fitting clothing.   *Do not use moisturizers, creams, lotions or perfume.  *All jewelry and valuables should be left at home.  *Prosthetic devices such as contact lenses, hearing aids, dentures, eyelash extensions, hairpins and body piercing must be removed before surgery.     BRING WITH YOU:  *Photo ID and insurance card  *Current list of medications and allergies  *Pacemaker/Defibrillator/Heart stent cards  *CPAP machine and mask  *Slings/splints/crutches  *Copy of your complete Advanced Directive/DHPOA-if applicable  *Neurostimulator implant remote     PARKING AND ARRIVAL:  *Check in at the Main Entrance desk and let them know you are here for surgery.     IF YOU ARE HAVING OUTPATIENT/SAME DAY SURGERY:  *A responsible adult MUST accompany you at the time of discharge and stay with you for 24 hours after your surgery.  *You may NOT drive yourself home after surgery.  *You may use a taxi or ride sharing service ([employer], Uber) to return home ONLY if you are accompanied by a friend or family member.  *Instructions for resuming your medications will be provided by your surgeon.     Thank you for coming to Pre Admission testing.      If I have prescribed medication please don't forget to  at your pharmacy.      Any questions about today's visit call [phone] and leave a message in the general mailbox.     Patient instructed to ambulate as soon as possible postoperatively to decrease thromboembolic risk.     Antonio García, APRN-CNP     Thank you for visiting the [facility].  If you have any changes to your health condition, please call the surgeons office to alert them and give them details of your symptoms.        Preoperative Fasting Guidelines     Why must I stop eating and drinking near surgery time?  With sedation, food or liquid in your stomach can enter your lungs causing serious complications  Increases nausea and vomiting     When do I need to stop eating and drinking before my surgery?  Do not eat any food after midnight the night before your surgery/procedure.  You may have up to TEN ounces of clear liquid until TWO hours before your instructed arrival time to the hospital.  This includes water, black tea/coffee, (no milk or cream) apple juice, and electrolyte drinks (Gatorade)  You may chew gum until TWO hours before your surgery/procedure        Additional Instructions:      The Day before Surgery:  -Review your medication instructions, stop indicated medications  -You will be contacted in the evening regarding the time of your arrival to facility and surgery time     Day of Surgery:  -Review your medication instructions, take indicated medications  -Wear comfortable loose fitting clothing  -Do not use moisturizers, creams, lotions or perfume  -All jewelry and valuables should be left at home                   Preoperative Brain Exercises     What are brain exercises?  A brain exercise is any activity that engages your thinking (cognitive) skills.     What types of activities are considered brain exercises?  Jigsaw puzzles, crossword puzzles, word jumble, memory games, word search, and many more.  Many can be found free online or on your phone via a mobile maile.     Why should I do brain exercises before my surgery?  More recent research has shown brain exercise before surgery can lower the risk of postoperative delirium (confusion) which can be especially important for older adults.  Patients who did brain exercises for 5 to 10 minutes/day in the days before  surgery, cut their risk of postoperative delirium in half up to 1 week after surgery.                         [facility]     Preoperative Deep Breathing Exercises     Why it is important to do deep breathing exercises before my surgery?  Deep breathing exercises strengthen your breathing muscles.  This helps you to recover after your surgery and decreases the chance of breathing complications.        How are the deep breathing exercises done?  Sit straight with your back supported.  Breathe in deeply and slowly through your nose. Your lower rib cage should expand and your abdomen may move forward.  Hold that breath for 3 to 5 seconds.  Breathe out through pursed lips, slowly and completely.  Rest and repeat 10 times every hour while awake.  Rest longer if you become dizzy or lightheaded.                      [facility]     Preoperative Deep Breathing Exercises     Why it is important to do deep breathing exercises before my surgery?  Deep breathing exercises strengthen your breathing muscles.  This helps you to recover after your surgery and decreases the chance of breathing complications.        How are the deep breathing exercises done?  Sit straight with your back supported.  Breathe in deeply and slowly through your nose. Your lower rib cage should expand and your abdomen may move forward.  Hold that breath for 3 to 5 seconds.  Breathe out through pursed lips, slowly and completely.  Rest and repeat 10 times every hour while awake.  Rest longer if you become dizzy or lightheaded.        Patient Information: Incentive Spirometer  What is an incentive spirometer?  An incentive spirometer is a device used before and after surgery to “exercise” your lungs.  It helps you to take deeper breaths to expand your lungs.  Below is an example of a basic incentive spirometer.  The device you receive may differ slightly but they all function the same.    Why do I need to  use an incentive spirometer?  Using your incentive spirometer prepares your lungs for surgery and helps prevent lung problems after surgery.  How do I use my incentive spirometer?  When you're using your incentive spirometer, make sure to breathe through your mouth. If you breathe through your nose, the incentive spirometer won't work properly. You can hold your nose if you have trouble.  If you feel dizzy at any time, stop and rest. Try again at a later time.  Follow the steps below:  Set up your incentive spirometer, expand the flexible tubing and connect to the outlet.  Sit upright in a chair or bed. Hold the incentive spirometer at eye level.   Put the mouthpiece in your mouth and close your lips tightly around it. Slowly breathe out (exhale) completely.  Breathe in (inhale) slowly through your mouth as deeply as you can. As you take a breath, you will see the piston rise inside the large column. While the piston rises, the indicator should move upwards. It should stay in between the 2 arrows (see Figure).  Try to get the piston as high as you can, while keeping the indicator between the arrows.   If the indicator doesn't stay between the arrows, you're breathing either too fast or too slow.  When you get it as high as you can, hold your breath for 10 seconds, or as long as possible. While you're holding your breath, the piston will slowly fall to the base of the spirometer.  Once the piston reaches the bottom of the spirometer, breathe out slowly through your mouth. Rest for a few seconds.  Repeat 10 times. Try to get the piston to the same level with each breath.  Repeat every hour while awake  You can carefully clean the outside of the mouthpiece with an alcohol wipe or soap and water.       Patient and Family Education             Ways You Can Help Prevent Blood Clots                    This handout explains some simple things you can do to help prevent blood clots.      Blood clots are blockages that can  form in the body's veins. When a blood clot forms in your deep veins, it may be called a deep vein thrombosis, or DVT for short. Blood clots can happen in any part of the body where blood flows, but they are most common in the arms and legs. If a piece of a blood clot breaks free and travels to the lungs, it is called a pulmonary embolus (PE). A PE can be a very serious problem.         Being in the hospital or having surgery can raise your chances of getting a blood clot because you may not be well enough to move around as much as you normally do.         Ways you can help prevent blood clots in the hospital           Wearing SCDs. SCDs stands for Sequential Compression Devices.   SCDs are special sleeves that wrap around your legs  They attach to a pump that fills them with air to gently squeeze your legs every few minutes.   This helps return the blood in your legs to your heart.   SCDs should only be taken off when walking or bathing.   SCDs may not be comfortable, but they can help save your life.                                            Wearing compression stockings - if your doctor orders them. These special snug fitting stockings gently squeeze your legs to help blood flow.       Walking. Walking helps move the blood in your legs.   If your doctor says it is ok, try walking the halls at least   5 times a day. Ask us to help you get up, so you don't fall.      Taking any blood thinning medicines your doctor orders.        Page 1 of 2            [facility]; 3/23   Ways you can help prevent blood clots at home         Wearing compression stockings - if your doctor orders them. ? Walking - to help move the blood in your legs.       Taking any blood thinning medicines your doctor orders.      Signs of a blood clot or PE        Tell your doctor or nurse know right away if you have of the problems listed below.    If you are at home, seek medical care right away. Call 911 for chest pain or problems  breathing.                Signs of a blood clot (DVT) - such as pain,  swelling, redness or warmth in your arm or leg      Signs of a pulmonary embolism (PE) - such as chest pain or feeling short of breath    Patient Information: Pre-Operative Infection Prevention Measures     Why did I have my nose, under my arms, and groin swabbed?  The purpose of the swab is to identify Staphylococcus aureus inside your nose or on your skin.  The swab was sent to the laboratory for culture.  A positive swab/culture for Staphylococcus aureus is called colonization or carriage.      What is Staphylococcus aureus?  Staphylococcus aureus, also known as “staph”, is a germ found on the skin or in the nose of healthy people.  Sometimes Staphylococcus aureus can get into the body and cause an infection.  This can be minor (such as pimples, boils, or other skin problems).  It might also be serious (such as a blood infection, pneumonia, or a surgical site infection).    What is Staphylococcus aureus colonization or carriage?  Colonization or carriage means that a person has the germ but is not sick from it.  These bacteria can be spread on the hands or when breathing or sneezing.    How is Staphylococcus aureus spread?  It is most often spread by close contact with a person or item that carries it.    What happens if my culture is positive for Staphylococcus aureus?  Your doctor/medical team will use this information to guide any antibiotic treatment which may be necessary.  Regardless of the culture results, we will clean the inside of your nose with a betadine swab just before you have your surgery.      Will I get an infection if I have Staphylococcus aureus in my nose or on my skin?  Anyone can get an infection with Staphylococcus aureus.  However, the best way to reduce your risk of infection is to follow the instructions provided to you for the use of your CHG soap and dental rinse.        Patient Information: Oral/Dental  Rinse    What is oral/dental rinse?   It is a mouthwash. It is a way of cleaning the mouth with a germ-killing solution before your surgery.  The solution contains chlorhexidine, commonly known as CHG.   It is used inside the mouth to kill a bacteria known as Staphylococcus aureus.  Let your doctor know if you are allergic to Chlorhexidine.    We have sent a prescription for CHG 0.12% mouthwash to your preferred pharmacy.  If you have not already, Please  your prescription and start using the day before before surgery.  Follow the instruction sheet provided to you at your CPM/PAT appointment. Please contact [facility] if you do not receive your CHG mouthwash prescription.     Why do I need to use CHG oral/dental rinse?  The CHG oral/dental rinse helps to kill a bacteria in your mouth known as Staphylococcus aureus.     This reduces the risk of infection at the surgical site.      Using your CHG oral/dental rinse  STEPS:  Use your CHG oral/dental rinse after you brush your teeth the night before (at bedtime) and the morning of your surgery.  Follow all directions on your prescription label.    Use the cap on the container to measure 15ml   Swish (gargle if you can) the mouthwash in your mouth for at least 30 seconds, (do not swallow) and spit out  After you use your CHG rinse, do not rinse your mouth with water, drink or eat.  Please refer to the prescription label for the appropriate time to resume oral intake      What side effects might I have using the CHG oral/dental rinse?  CHG rinse will stick to plaque on the teeth.  Brush and floss just before use.  Teeth brushing will help avoid staining of plaque during use.      Patient Information: Home Preoperative Antibacterial Shower      What is a home preoperative antibacterial shower?  This shower is a way of cleaning the skin with a germ-killing solution before surgery.  The solution contains chlorhexidine, commonly known as CHG.  CHG is a skin cleanser with  germ-killing ability.  Let your doctor know if you are allergic to chlorhexidine.    Why do I need to take a preoperative antibacterial shower?  Skin is not sterile.  It is best to try to make your skin as free of germs as possible before surgery.  Proper cleansing with a germ-killing soap before surgery can lower the number of germs on your skin.  This helps to reduce the risk of infection at the surgical site.  Following the instructions listed below will help you prepare your skin for surgery.      How do I use the solution?  Steps:  Begin using your CHG soap 1 day before your scheduled surgery on 5/11/2025.    First, wash and rinse your hair using the CHG soap. Keep CHG soap away from ear canals and eyes.  Rinse completely, do not condition.  Hair extensions should be removed.  Wash your face with your normal soap and rinse.    Apply the CHG solution to a clean wet washcloth.  Turn the water off or move away from the water spray to avoid premature rinsing of the CHG soap as you are applying.   Firmly lather your entire body from the neck down.  Do not use on your face.  Pay special attention to the area(s) where your incision(s) will be located unless they are on your face.  Avoid scrubbing your skin too hard.  The important point is to have the CHG soap sit on your skin for 3 minutes.    When the 3 minutes are up, turn on the water and rinse the CHG solution off your body completely.   DO NOT wash with regular soap after you have used the CHG soap solution  Pat yourself dry with a clean, freshly-laundered towel.  DO NOT apply powders, deodorants, or lotions.  Dress in clean, freshly laundered nightclothes.    Be sure to sleep with clean, freshly laundered sheets.  Be aware that CHG will cause stains on fabrics; if you wash them with bleach after use.  Rinse your washcloth and other linens that have contact with CHG completely.  Use only non-chlorine detergents to launder the items used.   The morning of surgery  is the fifth day.  Repeat the above steps and dress in clean comfortable clothing     Whom should I contact if I have any questions regarding the use of CHG soap?  Call the [facility] at [phone] if you have any questions.

## 2025-05-05 NOTE — CPM/PAT H&P
[facility]/PAT Evaluation       Name: Rosalva Castaneda (Rosalva Castaneda)  /Age: 2005/20 y.o.     In-Person       Chief Complaint: Congenital hip dysplasia, Tear of right acetabular labrum, initial encounter, Femoroacetabular impingement of right hip, and Hemarthrosis of right hip.     HPI  Patient is an alert and oriented 20 year old female scheduled for a right acetabulum reconstruction and femur osteotomy on 2025 with Dr. Fulton/Dr Mcneal for congenital hip dysplasia, tear of right acetabular labrum, initial encounter, femoroacetabular impingement of right hip, and hemarthrosis of right hip. She endorses right hip pain that she currently rates at a 6/10 which worsens during ambulation and activity. She is ambulatory without assistive devices with a current METS score of 9.9. PMHX includes migraines, ADHD, and hip dysplasia. Presents to [facility] today for perioperative risk stratification and optimization.    Medical History[1]    Surgical History[2]    Patient  has no history on file for sexual activity.    Family History[3]    Allergies[4]    Prior to Admission medications    Medication Sig Start Date End Date Taking? Authorizing Provider   amphetamine-dextroamphetamine (Adderall) 20 mg tablet Take 1.5 tablets (30 mg) by mouth once daily. 24 Yes Historical Provider, MD   ibuprofen 200 mg tablet Take 3 tablets (600 mg) by mouth once daily as needed for mild pain (1 - 3).   Yes Historical Provider, MD   Aimovig Autoinjector 140 mg/mL injection Inject 1 mL (140 mg) under the skin every 28 (twenty-eight) days. 3/29/24   Historical Provider, MD   SUMAtriptan (Imitrex) 6 mg/0.5 mL injection Inject 0.5 mL (6 mg) under the skin.  Patient not taking: Reported on 2025 3/29/24   Historical Provider, MD        Review of Systems   Constitutional: Negative for chills, decreased appetite, diaphoresis, fever and malaise/fatigue.   Eyes:  Negative for blurred vision and double vision.   Cardiovascular:   Negative for chest pain, claudication, cyanosis, dyspnea on exertion, irregular heartbeat, leg swelling, near-syncope and palpitations.   Respiratory:  Negative for cough, hemoptysis, shortness of breath and wheezing.    Endocrine: Negative for cold intolerance, heat intolerance, polydipsia, polyphagia and polyuria.   Gastrointestinal:  Negative for abdominal pain, constipation, diarrhea, dysphagia, nausea and vomiting.   Genitourinary:  Negative for bladder incontinence, dysuria, hematuria, incomplete emptying, nocturia, frequency, pelvic pain and urgency.   Neurological:  Negative for headaches, light-headedness, paresthesias, sensory change and weakness.   Psychiatric/Behavioral:  Negative for altered mental status.    Musculoskeletal: Negative for myalgias. Positive for arthralgias     Vitals and nursing note reviewed.     Physical exam  Constitutional:       Appearance: Normal appearance. She is Overweight.   HENT:      Head: Normocephalic and atraumatic.      Mouth/Throat:      Mouth: Mucous membranes are moist.      Pharynx: Oropharynx is clear.   Eyes:      Extraocular Movements: Extraocular movements intact.      Conjunctiva/sclera: Conjunctivae normal.      Pupils: Pupils are equal, round, and reactive to light.   Cardiovascular:      PMI at left midclavicular line. Normal rate. Regular rhythm. Normal S1. Normal S2.       Murmurs: There is no murmur.      No gallop.  No click. No rub.       No audible carotid bruit     No lower extremity edema on exam  Pulmonary:      Effort: Pulmonary effort is normal.      Breath sounds: Normal breath sounds.   Abdominal:      General: Abdomen is flat. Bowel sounds are normal.      Palpations: Abdomen is soft and non-tender  Musculoskeletal:      Cervical back: Normal range of motion and neck supple.      Hip, right: Limited ROM  Skin:     General: Skin is warm and dry.      Capillary Refill: Capillary refill takes less than 2 seconds.   Neurological:      General: No  "focal deficit present.      Mental Status: She is alert and oriented to person, place, and time. Mental status is at baseline.   Psychiatric:         Mood and Affect: Mood normal.         Behavior: Behavior normal.         Thought Content: Thought content normal.         Judgment: Judgment normal.     Vitals and nursing note reviewed. Physical exam within normal limits.     Visit Vitals  /72   Pulse 80   Temp 37.1 °C (98.8 °F) (Temporal)   Resp 16   Ht 1.575 m (5' 2\")   Wt 64.6 kg (142 lb 6.7 oz)   SpO2 99%   BMI 26.05 kg/m²   Smoking Status Unknown   BSA 1.68 m²     DASI Risk Score      Flowsheet Row Pre-Admission Testing from 5/5/2025 in [facility]   Can you take care of yourself (eat, dress, bathe, or use toilet)?  2.75 filed at 05/05/2025 1458   Can you walk indoors, such as around your house? 1.75 filed at 05/05/2025 1458   Can you walk a block or two on level ground?  2.75 filed at 05/05/2025 1458   Can you climb a flight of stairs or walk up a hill? 5.5 filed at 05/05/2025 1458   Can you run a short distance? 8 filed at 05/05/2025 1458   Can you do light work around the house like dusting or washing dishes? 2.7 filed at 05/05/2025 1458   Can you do moderate work around the house like vacuuming, sweeping floors or carrying groceries? 3.5 filed at 05/05/2025 1458   Can you do heavy work around the house like scrubbing floors or lifting and moving heavy furniture?  8 filed at 05/05/2025 1458   Can you do yard work like raking leaves, weeding or pushing a mower? 4.5 filed at 05/05/2025 1458   Can you have sexual relations? 5.25 filed at 05/05/2025 1458   Can you participate in moderate recreational activities like golf, bowling, dancing, doubles tennis or throwing a baseball or football? 6 filed at 05/05/2025 1458   Can you participate in strenous sports like swimming, singles tennis, football, basketball, or skiing? 7.5 filed at 05/05/2025 1457   DASI SCORE 58.2 filed at 05/05/2025 1454 "   METS Score (Will be calculated only when all the questions are answered) 9.9 filed at 05/05/2025 1458          Caprini DVT Assessment      Flowsheet Row Pre-Admission Testing from 5/5/2025 in [facility]   DVT Score (IF A SCORE IS NOT CALCULATING, MUST SELECT A BMI TO COMPLETE) 4 filed at 05/05/2025 1458   Surgical Factors Major surgery planned, lasting 2-3 hours filed at 05/05/2025 1458   BMI (BMI MUST BE CHOSEN) 30 or less filed at 05/05/2025 1458          Modified Frailty Index      Flowsheet Row Pre-Admission Testing from 5/5/2025 in [facility]   Non-independent functional status (problems with dressing, bathing, personal grooming, or cooking) 0 filed at 05/05/2025 1500   History of diabetes mellitus  0 filed at 05/05/2025 1500   History of COPD 0 filed at 05/05/2025 1500   History of CHF No filed at 05/05/2025 1500   History of MI 0 filed at 05/05/2025 1500   History of Percutaneous Coronary Intervention, Cardiac Surgery, or Angina No filed at 05/05/2025 1500   Hypertension requiring the use of medication  0 filed at 05/05/2025 1500   Peripheral vascular disease 0 filed at 05/05/2025 1500   Impaired sensorium (cognitive impairement or loss, clouding, or delirium) 0 filed at 05/05/2025 1500   TIA or CVA withouy residual deficit 0 filed at 05/05/2025 1500   Cerebrovascular accident with deficit 0 filed at 05/05/2025 1500   Modified Frailty Index Calculator 0 filed at 05/05/2025 1500          [id number] Stroke Risk Points         N/A 0 to 9 Points:      Last Change: N/A          The [id number] risk score (Lip PRISCILLA, et al. 2009. © 2010 [employer]) quantifies the risk of stroke for a patient with atrial fibrillation. For patients without atrial fibrillation or under the age of 18 this score appears as N/A. Higher score values generally indicate higher risk of stroke.        This score is not applicable to this patient. Components are not calculated.           Revised Cardiac Risk Index      Flowsheet Row Pre-Admission Testing from 5/5/2025 in [facility]   High-Risk Surgery (Intraperitoneal, Intrathoracic,Suprainguinal vascular) 0 filed at 05/05/2025 1500   History of ischemic heart disease (History of MI, History of positive exercuse test, Current chest paint considered due to myocardial ischemia, Use of nitrate therapy, ECG with pathological Q Waves) 0 filed at 05/05/2025 1500   History of congestive heart failure (pulmonary edemia, bilateral rales or S3 gallop, Paroxysmal nocturnal dyspnea, CXR showing pulmonary vascular redistribution) 0 filed at 05/05/2025 1500   History of cerebrovascular disease (Prior TIA or stroke) 0 filed at 05/05/2025 1500   Pre-operative insulin treatment 0 filed at 05/05/2025 1500   Pre-operative creatinine>2 mg/dl 0 filed at 05/05/2025 1500   Revised Cardiac Risk Calculator 0 filed at 05/05/2025 1500          Apfel Simplified Score    No data to display       Risk Analysis Index Results This Encounter    No data found in the last 10 encounters.       Stop Bang Score      Flowsheet Row Pre-Admission Testing from 5/5/2025 in [facility]   Do you snore loudly? 0 filed at 05/05/2025 1431   Do you often feel tired or fatigued after your sleep? 1 filed at 05/05/2025 1431   Has anyone ever observed you stop breathing in your sleep? 0 filed at 05/05/2025 1431   Do you have or are you being treated for high blood pressure? 0 filed at 05/05/2025 1431   Recent BMI (Calculated) 26 filed at 05/05/2025 1431   Is BMI greater than 35 kg/m2? 0=No filed at 05/05/2025 1431   Age older than 50 years old? 0=No filed at 05/05/2025 1431   Is your neck circumference greater than 17 inches (Male) or 16 inches (Female)? 0 filed at 05/05/2025 1431   Gender - Male 0=No filed at 05/05/2025 1431   STOP-BANG Total Score 1 filed at 05/05/2025 1431          Prodigy: High Risk  Total Score: 0          ARISCAT Score for Postoperative  Pulmonary Complications      Flowsheet Row Pre-Admission Testing from 5/5/2025 in [facility]   Age Calculated Score 0 filed at 05/05/2025 1500   Preoperative SpO2 0 filed at 05/05/2025 1500   Respiratory infection in the last month Either upper or lower (i.e., URI, bronchitis, pneumonia), with fever and antibiotic treatment 0 filed at 05/05/2025 1500   Preoperative anemia (Hgb less than 10 g/dl) 0 filed at 05/05/2025 1500   Surgical incision  0 filed at 05/05/2025 1500   Duration of surgery  16 filed at 05/05/2025 1500   Emergency Procedure  0 filed at 05/05/2025 1500   ARISCAT Total Score  16 filed at 05/05/2025 1500          Lemus Perioperative Risk for Myocardial Infarction or Cardiac Arrest (ALYSON)      Flowsheet Row Pre-Admission Testing from 5/5/2025 in [facility]   Calculated Age Score 0.42 filed at 05/05/2025 1500   Functional Status  0 filed at 05/05/2025 1500   ASA Class  -3.29 filed at 05/05/2025 1500   Creatinine 0 filed at 05/05/2025 1500   Type of Procedure  0.80 filed at 05/05/2025 1500   ALYSON Total Score  -7.32 filed at 05/05/2025 1500   ALYSON % 0.07 filed at 05/05/2025 1500          Assessment & Plan:    Neuro:  No diagnosis or significant findings on chart review or clinical presentation and evaluation.     HEENT/Airway:  No diagnosis or significant findings on chart review or clinical presentation and evaluation.   STOP-BANG Score-1 point low risk for SAHIL    Mallampati::  II    TM distance::  >3 FB    Neck ROM::  Full  Dentures-denies  Crowns-denies  Implants-denies    Cardiovascular:  No diagnosis or significant findings on chart review or clinical presentation and evaluation.   METS: 9.9  RCRI: 0 points, 3.9%  risk for postoperative MACE   ALYSON: 0.07% risk for postoperative MACE    Pulmonary:  No diagnosis or significant findings on chart review or clinical presentation and evaluation.   ARISCAT: <26 points, 1.6% risk of in-hospital postoperative pulmonary  complication  PRODIGY: Low risk for opioid induced respiratory depression  Smoking History-She has never smoked.  Discussed smoking cessation and deep breathing handout given    Renal/Urinary:  No diagnosis or significant findings on chart review or clinical presentation and evaluation.   CMP-Reviewed, stable  Creatinine-0.99  GFR-83  Positive for Hypoglycemia-resulted at 66 in PAT. Asymptomatic. Recheck glucose on DOS as she will be NPO.     Endocrine:  No diagnosis or significant findings on chart review or clinical presentation and evaluation.     Hematologic/Immunology:  No diagnosis or significant findings on chart review or clinical presentation and evaluation.  The patient is not a Jehovah’s witness and will accept blood and blood products if medically indicated.   History of previous blood transfusions No  CBC-Reviewed, stable  HGB-13.7  Caprini Score 4, patient at Moderate for postoperative DVT. Pt supplied education/VTE handout  Anticoagulation use: No     Gastrointestinal:   No diagnosis or significant findings on chart review or clinical presentation and evaluation.   Recreational drug use: alcohol  Alcohol use social drinker    Infectious disease:   No diagnosis or significant findings on chart review or clinical presentation and evaluation.   Prescription provided for CHG body wash and dental rinse. CHG use instructions reviewed and provided to patient.  Staph screen collected-Positive for MSSA    Musculoskeletal:   No diagnosis or significant findings on chart review or clinical presentation and evaluation.   JHFRAT score-0 points. low risk for falls    Anesthesia:  ASA 2 - Patient with mild systemic disease with no functional limitations  Anticipated anesthesia-general  History of General anesthesia- no  Complications- No anesthesia complications  Family history of anesthesia complications-mother PONV    Labs & Imaging ordered:  CBC, CMP, MRSA, T&S, VERAB  Nickel/metal allergy-negative  Shellfish  allergy-negative    Overall, patient Low Risk for the scheduled Moderate Risk surgery. Discussed with patient medication instructions, NPO guidelines, and any questions or concerns.     Face to face time spent 45 minutes       [1]   Past Medical History:  Diagnosis Date    ADHD     Migraines    [2]   Past Surgical History:  Procedure Laterality Date    DENTAL SURGERY     [3] No family history on file.  [4] No Known Allergies

## 2025-05-05 NOTE — H&P (VIEW-ONLY)
[facility]/PAT Evaluation       Name: Rosalva Castaneda (Rosalva Castaneda)  /Age: 2005/20 y.o.     In-Person       Chief Complaint: Congenital hip dysplasia, Tear of right acetabular labrum, initial encounter, Femoroacetabular impingement of right hip, and Hemarthrosis of right hip.     HPI  Patient is an alert and oriented 20 year old female scheduled for a right acetabulum reconstruction and femur osteotomy on 2025 with Dr. Fulton/Dr Mcneal for congenital hip dysplasia, tear of right acetabular labrum, initial encounter, femoroacetabular impingement of right hip, and hemarthrosis of right hip. She endorses right hip pain that she currently rates at a 6/10 which worsens during ambulation and activity. She is ambulatory without assistive devices with a current METS score of 9.9. PMHX includes migraines, ADHD, and hip dysplasia. Presents to [facility] today for perioperative risk stratification and optimization.    Medical History[1]    Surgical History[2]    Patient  has no history on file for sexual activity.    Family History[3]    Allergies[4]    Prior to Admission medications    Medication Sig Start Date End Date Taking? Authorizing Provider   amphetamine-dextroamphetamine (Adderall) 20 mg tablet Take 1.5 tablets (30 mg) by mouth once daily. 24 Yes Historical Provider, MD   ibuprofen 200 mg tablet Take 3 tablets (600 mg) by mouth once daily as needed for mild pain (1 - 3).   Yes Historical Provider, MD   Aimovig Autoinjector 140 mg/mL injection Inject 1 mL (140 mg) under the skin every 28 (twenty-eight) days. 3/29/24   Historical Provider, MD   SUMAtriptan (Imitrex) 6 mg/0.5 mL injection Inject 0.5 mL (6 mg) under the skin.  Patient not taking: Reported on 2025 3/29/24   Historical Provider, MD        Review of Systems   Constitutional: Negative for chills, decreased appetite, diaphoresis, fever and malaise/fatigue.   Eyes:  Negative for blurred vision and double vision.   Cardiovascular:   Negative for chest pain, claudication, cyanosis, dyspnea on exertion, irregular heartbeat, leg swelling, near-syncope and palpitations.   Respiratory:  Negative for cough, hemoptysis, shortness of breath and wheezing.    Endocrine: Negative for cold intolerance, heat intolerance, polydipsia, polyphagia and polyuria.   Gastrointestinal:  Negative for abdominal pain, constipation, diarrhea, dysphagia, nausea and vomiting.   Genitourinary:  Negative for bladder incontinence, dysuria, hematuria, incomplete emptying, nocturia, frequency, pelvic pain and urgency.   Neurological:  Negative for headaches, light-headedness, paresthesias, sensory change and weakness.   Psychiatric/Behavioral:  Negative for altered mental status.    Musculoskeletal: Negative for myalgias. Positive for arthralgias     Vitals and nursing note reviewed.     Physical exam  Constitutional:       Appearance: Normal appearance. She is Overweight.   HENT:      Head: Normocephalic and atraumatic.      Mouth/Throat:      Mouth: Mucous membranes are moist.      Pharynx: Oropharynx is clear.   Eyes:      Extraocular Movements: Extraocular movements intact.      Conjunctiva/sclera: Conjunctivae normal.      Pupils: Pupils are equal, round, and reactive to light.   Cardiovascular:      PMI at left midclavicular line. Normal rate. Regular rhythm. Normal S1. Normal S2.       Murmurs: There is no murmur.      No gallop.  No click. No rub.       No audible carotid bruit     No lower extremity edema on exam  Pulmonary:      Effort: Pulmonary effort is normal.      Breath sounds: Normal breath sounds.   Abdominal:      General: Abdomen is flat. Bowel sounds are normal.      Palpations: Abdomen is soft and non-tender  Musculoskeletal:      Cervical back: Normal range of motion and neck supple.      Hip, right: Limited ROM  Skin:     General: Skin is warm and dry.      Capillary Refill: Capillary refill takes less than 2 seconds.   Neurological:      General: No  "focal deficit present.      Mental Status: She is alert and oriented to person, place, and time. Mental status is at baseline.   Psychiatric:         Mood and Affect: Mood normal.         Behavior: Behavior normal.         Thought Content: Thought content normal.         Judgment: Judgment normal.     Vitals and nursing note reviewed. Physical exam within normal limits.     Visit Vitals  /72   Pulse 80   Temp 37.1 °C (98.8 °F) (Temporal)   Resp 16   Ht 1.575 m (5' 2\")   Wt 64.6 kg (142 lb 6.7 oz)   SpO2 99%   BMI 26.05 kg/m²   Smoking Status Unknown   BSA 1.68 m²     DASI Risk Score      Flowsheet Row Pre-Admission Testing from 5/5/2025 in [facility]   Can you take care of yourself (eat, dress, bathe, or use toilet)?  2.75 filed at 05/05/2025 1458   Can you walk indoors, such as around your house? 1.75 filed at 05/05/2025 1458   Can you walk a block or two on level ground?  2.75 filed at 05/05/2025 1458   Can you climb a flight of stairs or walk up a hill? 5.5 filed at 05/05/2025 1458   Can you run a short distance? 8 filed at 05/05/2025 1458   Can you do light work around the house like dusting or washing dishes? 2.7 filed at 05/05/2025 1458   Can you do moderate work around the house like vacuuming, sweeping floors or carrying groceries? 3.5 filed at 05/05/2025 1458   Can you do heavy work around the house like scrubbing floors or lifting and moving heavy furniture?  8 filed at 05/05/2025 1458   Can you do yard work like raking leaves, weeding or pushing a mower? 4.5 filed at 05/05/2025 1458   Can you have sexual relations? 5.25 filed at 05/05/2025 1458   Can you participate in moderate recreational activities like golf, bowling, dancing, doubles tennis or throwing a baseball or football? 6 filed at 05/05/2025 1458   Can you participate in strenous sports like swimming, singles tennis, football, basketball, or skiing? 7.5 filed at 05/05/2025 1452   DASI SCORE 58.2 filed at 05/05/2025 1450 "   METS Score (Will be calculated only when all the questions are answered) 9.9 filed at 05/05/2025 1458          Caprini DVT Assessment      Flowsheet Row Pre-Admission Testing from 5/5/2025 in [facility]   DVT Score (IF A SCORE IS NOT CALCULATING, MUST SELECT A BMI TO COMPLETE) 4 filed at 05/05/2025 1458   Surgical Factors Major surgery planned, lasting 2-3 hours filed at 05/05/2025 1458   BMI (BMI MUST BE CHOSEN) 30 or less filed at 05/05/2025 1458          Modified Frailty Index      Flowsheet Row Pre-Admission Testing from 5/5/2025 in [facility]   Non-independent functional status (problems with dressing, bathing, personal grooming, or cooking) 0 filed at 05/05/2025 1500   History of diabetes mellitus  0 filed at 05/05/2025 1500   History of COPD 0 filed at 05/05/2025 1500   History of CHF No filed at 05/05/2025 1500   History of MI 0 filed at 05/05/2025 1500   History of Percutaneous Coronary Intervention, Cardiac Surgery, or Angina No filed at 05/05/2025 1500   Hypertension requiring the use of medication  0 filed at 05/05/2025 1500   Peripheral vascular disease 0 filed at 05/05/2025 1500   Impaired sensorium (cognitive impairement or loss, clouding, or delirium) 0 filed at 05/05/2025 1500   TIA or CVA withouy residual deficit 0 filed at 05/05/2025 1500   Cerebrovascular accident with deficit 0 filed at 05/05/2025 1500   Modified Frailty Index Calculator 0 filed at 05/05/2025 1500          [id number] Stroke Risk Points         N/A 0 to 9 Points:      Last Change: N/A          The [id number] risk score (Lip PRISCILLA, et al. 2009. © 2010 American [employer]) quantifies the risk of stroke for a patient with atrial fibrillation. For patients without atrial fibrillation or under the age of 18 this score appears as N/A. Higher score values generally indicate higher risk of stroke.        This score is not applicable to this patient. Components are not calculated.           Revised Cardiac Risk Index      Flowsheet Row Pre-Admission Testing from 5/5/2025 in [facility]   High-Risk Surgery (Intraperitoneal, Intrathoracic,Suprainguinal vascular) 0 filed at 05/05/2025 1500   History of ischemic heart disease (History of MI, History of positive exercuse test, Current chest paint considered due to myocardial ischemia, Use of nitrate therapy, ECG with pathological Q Waves) 0 filed at 05/05/2025 1500   History of congestive heart failure (pulmonary edemia, bilateral rales or S3 gallop, Paroxysmal nocturnal dyspnea, CXR showing pulmonary vascular redistribution) 0 filed at 05/05/2025 1500   History of cerebrovascular disease (Prior TIA or stroke) 0 filed at 05/05/2025 1500   Pre-operative insulin treatment 0 filed at 05/05/2025 1500   Pre-operative creatinine>2 mg/dl 0 filed at 05/05/2025 1500   Revised Cardiac Risk Calculator 0 filed at 05/05/2025 1500          Apfel Simplified Score    No data to display       Risk Analysis Index Results This Encounter    No data found in the last 10 encounters.       Stop Bang Score      Flowsheet Row Pre-Admission Testing from 5/5/2025 in [facility]   Do you snore loudly? 0 filed at 05/05/2025 1431   Do you often feel tired or fatigued after your sleep? 1 filed at 05/05/2025 1431   Has anyone ever observed you stop breathing in your sleep? 0 filed at 05/05/2025 1431   Do you have or are you being treated for high blood pressure? 0 filed at 05/05/2025 1431   Recent BMI (Calculated) 26 filed at 05/05/2025 1431   Is BMI greater than 35 kg/m2? 0=No filed at 05/05/2025 1431   Age older than 50 years old? 0=No filed at 05/05/2025 1431   Is your neck circumference greater than 17 inches (Male) or 16 inches (Female)? 0 filed at 05/05/2025 1431   Gender - Male 0=No filed at 05/05/2025 1431   STOP-BANG Total Score 1 filed at 05/05/2025 1431          Prodigy: High Risk  Total Score: 0          ARISCAT Score for Postoperative  Pulmonary Complications      Flowsheet Row Pre-Admission Testing from 5/5/2025 in [facility]   Age Calculated Score 0 filed at 05/05/2025 1500   Preoperative SpO2 0 filed at 05/05/2025 1500   Respiratory infection in the last month Either upper or lower (i.e., URI, bronchitis, pneumonia), with fever and antibiotic treatment 0 filed at 05/05/2025 1500   Preoperative anemia (Hgb less than 10 g/dl) 0 filed at 05/05/2025 1500   Surgical incision  0 filed at 05/05/2025 1500   Duration of surgery  16 filed at 05/05/2025 1500   Emergency Procedure  0 filed at 05/05/2025 1500   ARISCAT Total Score  16 filed at 05/05/2025 1500          Lemus Perioperative Risk for Myocardial Infarction or Cardiac Arrest (ALYSON)      Flowsheet Row Pre-Admission Testing from 5/5/2025 in [facility]   Calculated Age Score 0.42 filed at 05/05/2025 1500   Functional Status  0 filed at 05/05/2025 1500   ASA Class  -3.29 filed at 05/05/2025 1500   Creatinine 0 filed at 05/05/2025 1500   Type of Procedure  0.80 filed at 05/05/2025 1500   ALYSON Total Score  -7.32 filed at 05/05/2025 1500   ALYSON % 0.07 filed at 05/05/2025 1500          Assessment & Plan:    Neuro:  No diagnosis or significant findings on chart review or clinical presentation and evaluation.     HEENT/Airway:  No diagnosis or significant findings on chart review or clinical presentation and evaluation.   STOP-BANG Score-1 point low risk for SAHIL    Mallampati::  II    TM distance::  >3 FB    Neck ROM::  Full  Dentures-denies  Crowns-denies  Implants-denies    Cardiovascular:  No diagnosis or significant findings on chart review or clinical presentation and evaluation.   METS: 9.9  RCRI: 0 points, 3.9%  risk for postoperative MACE   ALYSON: 0.07% risk for postoperative MACE    Pulmonary:  No diagnosis or significant findings on chart review or clinical presentation and evaluation.   ARISCAT: <26 points, 1.6% risk of in-hospital postoperative pulmonary  complication  PRODIGY: Low risk for opioid induced respiratory depression  Smoking History-She has never smoked.  Discussed smoking cessation and deep breathing handout given    Renal/Urinary:  No diagnosis or significant findings on chart review or clinical presentation and evaluation.   CMP-Reviewed, stable  Creatinine-0.99  GFR-83  Positive for Hypoglycemia-resulted at 66 in PAT. Asymptomatic. Recheck glucose on DOS as she will be NPO.     Endocrine:  No diagnosis or significant findings on chart review or clinical presentation and evaluation.     Hematologic/Immunology:  No diagnosis or significant findings on chart review or clinical presentation and evaluation.  The patient is not a Jehovah’s witness and will accept blood and blood products if medically indicated.   History of previous blood transfusions No  CBC-Reviewed, stable  HGB-13.7  Caprini Score 4, patient at Moderate for postoperative DVT. Pt supplied education/VTE handout  Anticoagulation use: No     Gastrointestinal:   No diagnosis or significant findings on chart review or clinical presentation and evaluation.   Recreational drug use: alcohol  Alcohol use social drinker    Infectious disease:   No diagnosis or significant findings on chart review or clinical presentation and evaluation.   Prescription provided for CHG body wash and dental rinse. CHG use instructions reviewed and provided to patient.  Staph screen collected-Positive for MSSA    Musculoskeletal:   No diagnosis or significant findings on chart review or clinical presentation and evaluation.   JHFRAT score-0 points. low risk for falls    Anesthesia:  ASA 2 - Patient with mild systemic disease with no functional limitations  Anticipated anesthesia-general  History of General anesthesia- no  Complications- No anesthesia complications  Family history of anesthesia complications-mother PONV    Labs & Imaging ordered:  CBC, CMP, MRSA, T&S, VERAB  Nickel/metal allergy-negative  Shellfish  allergy-negative    Overall, patient Low Risk for the scheduled Moderate Risk surgery. Discussed with patient medication instructions, NPO guidelines, and any questions or concerns.     Face to face time spent 45 minutes       [1]   Past Medical History:  Diagnosis Date    ADHD     Migraines    [2]   Past Surgical History:  Procedure Laterality Date    DENTAL SURGERY     [3] No family history on file.  [4] No Known Allergies

## 2025-05-07 LAB — STAPHYLOCOCCUS SPEC CULT: ABNORMAL

## 2025-05-12 ENCOUNTER — SURGERY (OUTPATIENT)
Age: 20
End: 2025-05-12
Payer: COMMERCIAL

## 2025-05-12 ENCOUNTER — ANESTHESIA (OUTPATIENT)
Dept: OPERATING ROOM | Facility: HOSPITAL | Age: 20
DRG: 481 | End: 2025-05-12
Payer: COMMERCIAL

## 2025-05-12 ENCOUNTER — APPOINTMENT (OUTPATIENT)
Dept: RADIOLOGY | Facility: HOSPITAL | Age: 20
DRG: 481 | End: 2025-05-12
Payer: COMMERCIAL

## 2025-05-12 ENCOUNTER — HOSPITAL ENCOUNTER (INPATIENT)
Facility: HOSPITAL | Age: 20
LOS: 2 days | Discharge: HOME | DRG: 481 | End: 2025-05-14
Attending: ORTHOPAEDIC SURGERY | Admitting: ORTHOPAEDIC SURGERY
Payer: COMMERCIAL

## 2025-05-12 DIAGNOSIS — Q65.89 CONGENITAL HIP DYSPLASIA (HHS-HCC): Primary | ICD-10-CM

## 2025-05-12 DIAGNOSIS — M25.851 FEMOROACETABULAR IMPINGEMENT OF RIGHT HIP: ICD-10-CM

## 2025-05-12 DIAGNOSIS — M25.051 HEMARTHROSIS OF RIGHT HIP: ICD-10-CM

## 2025-05-12 DIAGNOSIS — S73.191A TEAR OF RIGHT ACETABULAR LABRUM, INITIAL ENCOUNTER: ICD-10-CM

## 2025-05-12 PROBLEM — R51.9 CHRONIC HEADACHES: Status: ACTIVE | Noted: 2025-05-12

## 2025-05-12 PROBLEM — G89.29 CHRONIC HEADACHES: Status: ACTIVE | Noted: 2025-05-12

## 2025-05-12 LAB
ABO GROUP (TYPE) IN BLOOD: NORMAL
ANTIBODY SCREEN: NORMAL
HCG UR QL IA.RAPID: NEGATIVE
HCT VFR BLD AUTO: 36.8 % (ref 36–46)
HGB BLD-MCNC: 12.6 G/DL (ref 12–16)
RH FACTOR (ANTIGEN D): NORMAL

## 2025-05-12 PROCEDURE — A27450 PR OSTEOTOMY FEMUR SHAFT,W FIXATN

## 2025-05-12 PROCEDURE — 2500000004 HC RX 250 GENERAL PHARMACY W/ HCPCS (ALT 636 FOR OP/ED): Performed by: PHYSICIAN ASSISTANT

## 2025-05-12 PROCEDURE — 3700000001 HC GENERAL ANESTHESIA TIME - INITIAL BASE CHARGE: Performed by: ORTHOPAEDIC SURGERY

## 2025-05-12 PROCEDURE — 36415 COLL VENOUS BLD VENIPUNCTURE: CPT | Performed by: PHYSICIAN ASSISTANT

## 2025-05-12 PROCEDURE — 2500000004 HC RX 250 GENERAL PHARMACY W/ HCPCS (ALT 636 FOR OP/ED): Mod: JZ

## 2025-05-12 PROCEDURE — 2500000004 HC RX 250 GENERAL PHARMACY W/ HCPCS (ALT 636 FOR OP/ED): Mod: JZ | Performed by: ORTHOPAEDIC SURGERY

## 2025-05-12 PROCEDURE — 2500000001 HC RX 250 WO HCPCS SELF ADMINISTERED DRUGS (ALT 637 FOR MEDICARE OP): Performed by: PHYSICIAN ASSISTANT

## 2025-05-12 PROCEDURE — 0QS604Z REPOSITION RIGHT UPPER FEMUR WITH INTERNAL FIXATION DEVICE, OPEN APPROACH: ICD-10-PCS | Performed by: OBSTETRICS & GYNECOLOGY

## 2025-05-12 PROCEDURE — 3600000004 HC OR TIME - INITIAL BASE CHARGE - PROCEDURE LEVEL FOUR: Performed by: ORTHOPAEDIC SURGERY

## 2025-05-12 PROCEDURE — 2500000002 HC RX 250 W HCPCS SELF ADMINISTERED DRUGS (ALT 637 FOR MEDICARE OP, ALT 636 FOR OP/ED): Performed by: STUDENT IN AN ORGANIZED HEALTH CARE EDUCATION/TRAINING PROGRAM

## 2025-05-12 PROCEDURE — 3600000009 HC OR TIME - EACH INCREMENTAL 1 MINUTE - PROCEDURE LEVEL FOUR: Performed by: ORTHOPAEDIC SURGERY

## 2025-05-12 PROCEDURE — 99221 1ST HOSP IP/OBS SF/LOW 40: CPT | Performed by: STUDENT IN AN ORGANIZED HEALTH CARE EDUCATION/TRAINING PROGRAM

## 2025-05-12 PROCEDURE — 7100000001 HC RECOVERY ROOM TIME - INITIAL BASE CHARGE: Performed by: ORTHOPAEDIC SURGERY

## 2025-05-12 PROCEDURE — C1769 GUIDE WIRE: HCPCS | Performed by: ORTHOPAEDIC SURGERY

## 2025-05-12 PROCEDURE — 2500000002 HC RX 250 W HCPCS SELF ADMINISTERED DRUGS (ALT 637 FOR MEDICARE OP, ALT 636 FOR OP/ED): Performed by: PHYSICIAN ASSISTANT

## 2025-05-12 PROCEDURE — 2500000004 HC RX 250 GENERAL PHARMACY W/ HCPCS (ALT 636 FOR OP/ED): Mod: JZ | Performed by: STUDENT IN AN ORGANIZED HEALTH CARE EDUCATION/TRAINING PROGRAM

## 2025-05-12 PROCEDURE — C1713 ANCHOR/SCREW BN/BN,TIS/BN: HCPCS | Performed by: ORTHOPAEDIC SURGERY

## 2025-05-12 PROCEDURE — 85014 HEMATOCRIT: CPT | Performed by: STUDENT IN AN ORGANIZED HEALTH CARE EDUCATION/TRAINING PROGRAM

## 2025-05-12 PROCEDURE — 36415 COLL VENOUS BLD VENIPUNCTURE: CPT | Performed by: STUDENT IN AN ORGANIZED HEALTH CARE EDUCATION/TRAINING PROGRAM

## 2025-05-12 PROCEDURE — 86901 BLOOD TYPING SEROLOGIC RH(D): CPT | Performed by: PHYSICIAN ASSISTANT

## 2025-05-12 PROCEDURE — 81025 URINE PREGNANCY TEST: CPT | Performed by: ORTHOPAEDIC SURGERY

## 2025-05-12 PROCEDURE — 64447 NJX AA&/STRD FEMORAL NRV IMG: CPT | Performed by: STUDENT IN AN ORGANIZED HEALTH CARE EDUCATION/TRAINING PROGRAM

## 2025-05-12 PROCEDURE — 64466 THRC FASCIAL PLN BLK UNI NJX: CPT | Performed by: STUDENT IN AN ORGANIZED HEALTH CARE EDUCATION/TRAINING PROGRAM

## 2025-05-12 PROCEDURE — A27450 PR OSTEOTOMY FEMUR SHAFT,W FIXATN: Performed by: STUDENT IN AN ORGANIZED HEALTH CARE EDUCATION/TRAINING PROGRAM

## 2025-05-12 PROCEDURE — 3700000002 HC GENERAL ANESTHESIA TIME - EACH INCREMENTAL 1 MINUTE: Performed by: ORTHOPAEDIC SURGERY

## 2025-05-12 PROCEDURE — 1100000001 HC PRIVATE ROOM DAILY

## 2025-05-12 PROCEDURE — 2780000003 HC OR 278 NO HCPCS: Performed by: ORTHOPAEDIC SURGERY

## 2025-05-12 PROCEDURE — 2500000001 HC RX 250 WO HCPCS SELF ADMINISTERED DRUGS (ALT 637 FOR MEDICARE OP): Performed by: STUDENT IN AN ORGANIZED HEALTH CARE EDUCATION/TRAINING PROGRAM

## 2025-05-12 PROCEDURE — 2720000007 HC OR 272 NO HCPCS: Performed by: ORTHOPAEDIC SURGERY

## 2025-05-12 PROCEDURE — 7100000002 HC RECOVERY ROOM TIME - EACH INCREMENTAL 1 MINUTE: Performed by: ORTHOPAEDIC SURGERY

## 2025-05-12 DEVICE — PIN, HALF 5 X 150 SD 50/THR APEX: Type: IMPLANTABLE DEVICE | Site: HIP | Status: NON-FUNCTIONAL

## 2025-05-12 DEVICE — IMPLANTABLE DEVICE: Type: IMPLANTABLE DEVICE | Site: HIP | Status: NON-FUNCTIONAL

## 2025-05-12 DEVICE — ROD, CONNECTING 11 X 450 HOFFMANN3: Type: IMPLANTABLE DEVICE | Site: HIP | Status: NON-FUNCTIONAL

## 2025-05-12 DEVICE — GUIDE WIRE, GAM, 3.0MM X 1000MM: Type: IMPLANTABLE DEVICE | Site: LEG | Status: FUNCTIONAL

## 2025-05-12 DEVICE — LOCKING SCREW
Type: IMPLANTABLE DEVICE | Site: LEG | Status: FUNCTIONAL
Brand: T2 ALPHA

## 2025-05-12 DEVICE — FEMORAL NAIL PF, RIGHT
Type: IMPLANTABLE DEVICE | Site: LEG | Status: FUNCTIONAL
Brand: T2 ALPHA

## 2025-05-12 DEVICE — NANOTACK TT SUTURE ANCHOR, 1.4MM WITH 1.2MM XBRAID TT
Type: IMPLANTABLE DEVICE | Site: HIP | Status: FUNCTIONAL
Brand: NANOTACK

## 2025-05-12 RX ORDER — KETOROLAC TROMETHAMINE 30 MG/ML
INJECTION, SOLUTION INTRAMUSCULAR; INTRAVENOUS AS NEEDED
Status: DISCONTINUED | OUTPATIENT
Start: 2025-05-12 | End: 2025-05-12

## 2025-05-12 RX ORDER — LIDOCAINE HYDROCHLORIDE 10 MG/ML
INJECTION, SOLUTION EPIDURAL; INFILTRATION; INTRACAUDAL; PERINEURAL AS NEEDED
Status: DISCONTINUED | OUTPATIENT
Start: 2025-05-12 | End: 2025-05-12

## 2025-05-12 RX ORDER — ORPHENADRINE CITRATE 100 MG/1
100 TABLET, EXTENDED RELEASE ORAL 2 TIMES DAILY
Status: DISCONTINUED | OUTPATIENT
Start: 2025-05-12 | End: 2025-05-14 | Stop reason: HOSPADM

## 2025-05-12 RX ORDER — SENNOSIDES 8.6 MG/1
2 TABLET ORAL 2 TIMES DAILY
Status: DISCONTINUED | OUTPATIENT
Start: 2025-05-12 | End: 2025-05-14 | Stop reason: HOSPADM

## 2025-05-12 RX ORDER — DEXTROAMPHETAMINE SACCHARATE, AMPHETAMINE ASPARTATE, DEXTROAMPHETAMINE SULFATE AND AMPHETAMINE SULFATE 5; 5; 5; 5 MG/1; MG/1; MG/1; MG/1
30 TABLET ORAL
Status: DISCONTINUED | OUTPATIENT
Start: 2025-05-12 | End: 2025-05-14 | Stop reason: HOSPADM

## 2025-05-12 RX ORDER — GABAPENTIN 300 MG/1
300 CAPSULE ORAL 3 TIMES DAILY
Status: DISCONTINUED | OUTPATIENT
Start: 2025-05-12 | End: 2025-05-14 | Stop reason: HOSPADM

## 2025-05-12 RX ORDER — ONDANSETRON HYDROCHLORIDE 2 MG/ML
4 INJECTION, SOLUTION INTRAVENOUS EVERY 8 HOURS PRN
Status: DISCONTINUED | OUTPATIENT
Start: 2025-05-12 | End: 2025-05-14 | Stop reason: HOSPADM

## 2025-05-12 RX ORDER — ONDANSETRON HYDROCHLORIDE 2 MG/ML
4 INJECTION, SOLUTION INTRAVENOUS ONCE AS NEEDED
Status: DISCONTINUED | OUTPATIENT
Start: 2025-05-12 | End: 2025-05-12 | Stop reason: HOSPADM

## 2025-05-12 RX ORDER — CEFAZOLIN SODIUM 2 G/100ML
2 INJECTION, SOLUTION INTRAVENOUS ONCE
Status: COMPLETED | OUTPATIENT
Start: 2025-05-12 | End: 2025-05-12

## 2025-05-12 RX ORDER — SODIUM CHLORIDE, SODIUM LACTATE, POTASSIUM CHLORIDE, CALCIUM CHLORIDE 600; 310; 30; 20 MG/100ML; MG/100ML; MG/100ML; MG/100ML
100 INJECTION, SOLUTION INTRAVENOUS CONTINUOUS
Status: ACTIVE | OUTPATIENT
Start: 2025-05-12 | End: 2025-05-13

## 2025-05-12 RX ORDER — DEXMEDETOMIDINE HYDROCHLORIDE 100 UG/ML
INJECTION, SOLUTION INTRAVENOUS AS NEEDED
Status: DISCONTINUED | OUTPATIENT
Start: 2025-05-12 | End: 2025-05-12

## 2025-05-12 RX ORDER — ALBUTEROL SULFATE 0.83 MG/ML
2.5 SOLUTION RESPIRATORY (INHALATION) ONCE AS NEEDED
Status: DISCONTINUED | OUTPATIENT
Start: 2025-05-12 | End: 2025-05-12 | Stop reason: HOSPADM

## 2025-05-12 RX ORDER — HYDROMORPHONE HYDROCHLORIDE 0.2 MG/ML
0.2 INJECTION INTRAMUSCULAR; INTRAVENOUS; SUBCUTANEOUS EVERY 5 MIN PRN
Status: DISCONTINUED | OUTPATIENT
Start: 2025-05-12 | End: 2025-05-12 | Stop reason: HOSPADM

## 2025-05-12 RX ORDER — FENTANYL CITRATE 50 UG/ML
INJECTION, SOLUTION INTRAMUSCULAR; INTRAVENOUS AS NEEDED
Status: DISCONTINUED | OUTPATIENT
Start: 2025-05-12 | End: 2025-05-12

## 2025-05-12 RX ORDER — ACETAMINOPHEN 325 MG/1
975 TABLET ORAL ONCE
Status: COMPLETED | OUTPATIENT
Start: 2025-05-12 | End: 2025-05-12

## 2025-05-12 RX ORDER — MIDAZOLAM HYDROCHLORIDE 1 MG/ML
2 INJECTION, SOLUTION INTRAMUSCULAR; INTRAVENOUS ONCE
Status: COMPLETED | OUTPATIENT
Start: 2025-05-12 | End: 2025-05-12

## 2025-05-12 RX ORDER — ONDANSETRON HYDROCHLORIDE 2 MG/ML
INJECTION, SOLUTION INTRAVENOUS AS NEEDED
Status: DISCONTINUED | OUTPATIENT
Start: 2025-05-12 | End: 2025-05-12

## 2025-05-12 RX ORDER — ROCURONIUM BROMIDE 10 MG/ML
INJECTION, SOLUTION INTRAVENOUS AS NEEDED
Status: DISCONTINUED | OUTPATIENT
Start: 2025-05-12 | End: 2025-05-12

## 2025-05-12 RX ORDER — ACETAMINOPHEN 500 MG
1000 TABLET ORAL EVERY 6 HOURS
Status: DISCONTINUED | OUTPATIENT
Start: 2025-05-12 | End: 2025-05-14 | Stop reason: HOSPADM

## 2025-05-12 RX ORDER — OXYCODONE HYDROCHLORIDE 5 MG/1
10 TABLET ORAL EVERY 4 HOURS PRN
Status: DISCONTINUED | OUTPATIENT
Start: 2025-05-12 | End: 2025-05-12 | Stop reason: HOSPADM

## 2025-05-12 RX ORDER — MORPHINE SULFATE 2 MG/ML
2 INJECTION, SOLUTION INTRAMUSCULAR; INTRAVENOUS EVERY 6 HOURS
Status: COMPLETED | OUTPATIENT
Start: 2025-05-12 | End: 2025-05-14

## 2025-05-12 RX ORDER — OXYCODONE HYDROCHLORIDE 5 MG/1
5 TABLET ORAL EVERY 6 HOURS
Status: DISCONTINUED | OUTPATIENT
Start: 2025-05-12 | End: 2025-05-14 | Stop reason: HOSPADM

## 2025-05-12 RX ORDER — APREPITANT 40 MG/1
40 CAPSULE ORAL DAILY
Status: DISCONTINUED | OUTPATIENT
Start: 2025-05-12 | End: 2025-05-12

## 2025-05-12 RX ORDER — VANCOMYCIN HYDROCHLORIDE 1 G/20ML
INJECTION, POWDER, LYOPHILIZED, FOR SOLUTION INTRAVENOUS AS NEEDED
Status: DISCONTINUED | OUTPATIENT
Start: 2025-05-12 | End: 2025-05-12 | Stop reason: HOSPADM

## 2025-05-12 RX ORDER — ASPIRIN 81 MG/1
81 TABLET ORAL 2 TIMES DAILY
Status: DISCONTINUED | OUTPATIENT
Start: 2025-05-12 | End: 2025-05-14 | Stop reason: HOSPADM

## 2025-05-12 RX ORDER — ACETAMINOPHEN 325 MG/1
650 TABLET ORAL EVERY 4 HOURS PRN
Status: DISCONTINUED | OUTPATIENT
Start: 2025-05-12 | End: 2025-05-12 | Stop reason: HOSPADM

## 2025-05-12 RX ORDER — OXYCODONE HYDROCHLORIDE 5 MG/1
5 TABLET ORAL EVERY 4 HOURS PRN
Status: DISCONTINUED | OUTPATIENT
Start: 2025-05-12 | End: 2025-05-12 | Stop reason: HOSPADM

## 2025-05-12 RX ORDER — PROPOFOL 10 MG/ML
INJECTION, EMULSION INTRAVENOUS AS NEEDED
Status: DISCONTINUED | OUTPATIENT
Start: 2025-05-12 | End: 2025-05-12

## 2025-05-12 RX ORDER — DOCUSATE SODIUM 100 MG/1
100 CAPSULE, LIQUID FILLED ORAL 2 TIMES DAILY PRN
Status: DISCONTINUED | OUTPATIENT
Start: 2025-05-12 | End: 2025-05-14 | Stop reason: HOSPADM

## 2025-05-12 RX ORDER — CELECOXIB 200 MG/1
200 CAPSULE ORAL DAILY
Status: DISCONTINUED | OUTPATIENT
Start: 2025-05-13 | End: 2025-05-14 | Stop reason: HOSPADM

## 2025-05-12 RX ORDER — HYDROMORPHONE HYDROCHLORIDE 1 MG/ML
1 INJECTION, SOLUTION INTRAMUSCULAR; INTRAVENOUS; SUBCUTANEOUS ONCE
Status: COMPLETED | OUTPATIENT
Start: 2025-05-12 | End: 2025-05-12

## 2025-05-12 RX ORDER — POLYETHYLENE GLYCOL 3350 17 G/17G
17 POWDER, FOR SOLUTION ORAL 2 TIMES DAILY
Status: DISCONTINUED | OUTPATIENT
Start: 2025-05-12 | End: 2025-05-14 | Stop reason: HOSPADM

## 2025-05-12 RX ORDER — TRANEXAMIC ACID 10 MG/ML
INJECTION, SOLUTION INTRAVENOUS AS NEEDED
Status: DISCONTINUED | OUTPATIENT
Start: 2025-05-12 | End: 2025-05-12

## 2025-05-12 RX ORDER — FENTANYL CITRATE 50 UG/ML
50 INJECTION, SOLUTION INTRAMUSCULAR; INTRAVENOUS ONCE
Status: COMPLETED | OUTPATIENT
Start: 2025-05-12 | End: 2025-05-12

## 2025-05-12 RX ORDER — CEFAZOLIN SODIUM 2 G/100ML
2 INJECTION, SOLUTION INTRAVENOUS EVERY 8 HOURS
Status: COMPLETED | OUTPATIENT
Start: 2025-05-12 | End: 2025-05-13

## 2025-05-12 RX ORDER — METOCLOPRAMIDE HYDROCHLORIDE 5 MG/ML
10 INJECTION INTRAMUSCULAR; INTRAVENOUS EVERY 6 HOURS PRN
Status: DISCONTINUED | OUTPATIENT
Start: 2025-05-12 | End: 2025-05-14 | Stop reason: HOSPADM

## 2025-05-12 RX ORDER — APREPITANT 40 MG/1
40 CAPSULE ORAL DAILY
Status: DISCONTINUED | OUTPATIENT
Start: 2025-05-12 | End: 2025-05-12 | Stop reason: HOSPADM

## 2025-05-12 RX ORDER — SODIUM CHLORIDE, SODIUM LACTATE, POTASSIUM CHLORIDE, CALCIUM CHLORIDE 600; 310; 30; 20 MG/100ML; MG/100ML; MG/100ML; MG/100ML
75 INJECTION, SOLUTION INTRAVENOUS CONTINUOUS
Status: DISCONTINUED | OUTPATIENT
Start: 2025-05-12 | End: 2025-05-12 | Stop reason: HOSPADM

## 2025-05-12 RX ORDER — TALC
3 POWDER (GRAM) TOPICAL NIGHTLY PRN
Status: DISCONTINUED | OUTPATIENT
Start: 2025-05-12 | End: 2025-05-14 | Stop reason: HOSPADM

## 2025-05-12 RX ORDER — ONDANSETRON 4 MG/1
4 TABLET, ORALLY DISINTEGRATING ORAL EVERY 8 HOURS PRN
Status: DISCONTINUED | OUTPATIENT
Start: 2025-05-12 | End: 2025-05-14 | Stop reason: HOSPADM

## 2025-05-12 RX ORDER — ASPIRIN 325 MG
1.25 TABLET, DELAYED RELEASE (ENTERIC COATED) ORAL DAILY
Status: COMPLETED | OUTPATIENT
Start: 2025-05-12 | End: 2025-05-13

## 2025-05-12 RX ORDER — PANTOPRAZOLE SODIUM 40 MG/1
40 TABLET, DELAYED RELEASE ORAL
Status: DISCONTINUED | OUTPATIENT
Start: 2025-05-13 | End: 2025-05-14 | Stop reason: HOSPADM

## 2025-05-12 RX ORDER — GABAPENTIN 300 MG/1
600 CAPSULE ORAL ONCE
Status: COMPLETED | OUTPATIENT
Start: 2025-05-12 | End: 2025-05-12

## 2025-05-12 RX ORDER — SCOPOLAMINE 1 MG/3D
1 PATCH, EXTENDED RELEASE TRANSDERMAL
Status: DISCONTINUED | OUTPATIENT
Start: 2025-05-12 | End: 2025-05-12 | Stop reason: HOSPADM

## 2025-05-12 RX ORDER — DIPHENHYDRAMINE HCL 25 MG
25 TABLET ORAL EVERY 6 HOURS PRN
Status: DISCONTINUED | OUTPATIENT
Start: 2025-05-12 | End: 2025-05-14 | Stop reason: HOSPADM

## 2025-05-12 RX ORDER — TOBRAMYCIN 1.2 G/30ML
INJECTION, POWDER, LYOPHILIZED, FOR SOLUTION INTRAVENOUS AS NEEDED
Status: DISCONTINUED | OUTPATIENT
Start: 2025-05-12 | End: 2025-05-12 | Stop reason: HOSPADM

## 2025-05-12 RX ORDER — MIDAZOLAM HYDROCHLORIDE 1 MG/ML
INJECTION, SOLUTION INTRAMUSCULAR; INTRAVENOUS AS NEEDED
Status: DISCONTINUED | OUTPATIENT
Start: 2025-05-12 | End: 2025-05-12

## 2025-05-12 RX ADMIN — VANCOMYCIN HYDROCHLORIDE 1 G: 1 INJECTION, POWDER, LYOPHILIZED, FOR SOLUTION INTRAVENOUS at 13:14

## 2025-05-12 RX ADMIN — HYDROMORPHONE HYDROCHLORIDE 0.5 MG: 2 INJECTION INTRAMUSCULAR; INTRAVENOUS; SUBCUTANEOUS at 13:20

## 2025-05-12 RX ADMIN — SCOPOLAMINE 1 PATCH: 1.5 PATCH, EXTENDED RELEASE TRANSDERMAL at 09:28

## 2025-05-12 RX ADMIN — ROCURONIUM BROMIDE 30 MG: 10 INJECTION, SOLUTION INTRAVENOUS at 12:15

## 2025-05-12 RX ADMIN — ROCURONIUM BROMIDE 50 MG: 10 INJECTION, SOLUTION INTRAVENOUS at 10:04

## 2025-05-12 RX ADMIN — MORPHINE SULFATE 2 MG: 2 INJECTION, SOLUTION INTRAMUSCULAR; INTRAVENOUS at 19:00

## 2025-05-12 RX ADMIN — ACETAMINOPHEN 1000 MG: 500 TABLET, FILM COATED ORAL at 21:20

## 2025-05-12 RX ADMIN — KETOROLAC TROMETHAMINE 15 MG: 30 INJECTION, SOLUTION INTRAMUSCULAR at 13:20

## 2025-05-12 RX ADMIN — GABAPENTIN 600 MG: 300 CAPSULE ORAL at 09:08

## 2025-05-12 RX ADMIN — ONDANSETRON 4 MG: 2 INJECTION, SOLUTION INTRAMUSCULAR; INTRAVENOUS at 13:20

## 2025-05-12 RX ADMIN — OXYCODONE HYDROCHLORIDE 5 MG: 5 TABLET ORAL at 15:12

## 2025-05-12 RX ADMIN — OXYCODONE HYDROCHLORIDE 5 MG: 5 TABLET ORAL at 21:20

## 2025-05-12 RX ADMIN — ROCURONIUM BROMIDE 30 MG: 10 INJECTION, SOLUTION INTRAVENOUS at 10:55

## 2025-05-12 RX ADMIN — ACETAMINOPHEN 975 MG: 325 TABLET ORAL at 09:08

## 2025-05-12 RX ADMIN — CEFAZOLIN SODIUM 2 G: 2 INJECTION, SOLUTION INTRAVENOUS at 10:13

## 2025-05-12 RX ADMIN — SENNOSIDES 17.2 MG: 8.6 TABLET, FILM COATED ORAL at 21:20

## 2025-05-12 RX ADMIN — MIDAZOLAM 2 MG: 1 INJECTION INTRAMUSCULAR; INTRAVENOUS at 09:55

## 2025-05-12 RX ADMIN — ROCURONIUM BROMIDE 20 MG: 10 INJECTION, SOLUTION INTRAVENOUS at 10:35

## 2025-05-12 RX ADMIN — GABAPENTIN 300 MG: 300 CAPSULE ORAL at 16:06

## 2025-05-12 RX ADMIN — DEXMEDETOMIDINE HYDROCHLORIDE 10 MCG: 100 INJECTION, SOLUTION, CONCENTRATE INTRAVENOUS at 13:27

## 2025-05-12 RX ADMIN — ASPIRIN 81 MG: 81 TABLET, COATED ORAL at 21:20

## 2025-05-12 RX ADMIN — FENTANYL CITRATE 50 MCG: 50 INJECTION, SOLUTION INTRAMUSCULAR; INTRAVENOUS at 09:38

## 2025-05-12 RX ADMIN — POLYETHYLENE GLYCOL 3350 17 G: 17 POWDER, FOR SOLUTION ORAL at 21:20

## 2025-05-12 RX ADMIN — ROCURONIUM BROMIDE 30 MG: 10 INJECTION, SOLUTION INTRAVENOUS at 13:08

## 2025-05-12 RX ADMIN — LIDOCAINE HYDROCHLORIDE 5 ML: 10 INJECTION, SOLUTION EPIDURAL; INFILTRATION; INTRACAUDAL; PERINEURAL at 10:04

## 2025-05-12 RX ADMIN — HYDROMORPHONE HYDROCHLORIDE 0.2 MG: 0.2 INJECTION, SOLUTION INTRAMUSCULAR; INTRAVENOUS; SUBCUTANEOUS at 14:52

## 2025-05-12 RX ADMIN — EPINEPHRINE 4 MG: 1 INJECTION INTRAMUSCULAR; INTRAVENOUS; SUBCUTANEOUS at 10:38

## 2025-05-12 RX ADMIN — CHOLECALCIFEROL CAP 1.25 MG (50000 UNIT) 1.25 MG: 1.25 CAP at 16:07

## 2025-05-12 RX ADMIN — HYDROMORPHONE HYDROCHLORIDE 0.5 MG: 1 INJECTION, SOLUTION INTRAMUSCULAR; INTRAVENOUS; SUBCUTANEOUS at 17:09

## 2025-05-12 RX ADMIN — HYDROMORPHONE HYDROCHLORIDE 1 MG: 1 INJECTION, SOLUTION INTRAMUSCULAR; INTRAVENOUS; SUBCUTANEOUS at 15:54

## 2025-05-12 RX ADMIN — SUGAMMADEX 200 MG: 100 INJECTION, SOLUTION INTRAVENOUS at 13:30

## 2025-05-12 RX ADMIN — FENTANYL CITRATE 50 MCG: 50 INJECTION, SOLUTION INTRAMUSCULAR; INTRAVENOUS at 13:30

## 2025-05-12 RX ADMIN — DEXMEDETOMIDINE HYDROCHLORIDE 10 MCG: 100 INJECTION, SOLUTION, CONCENTRATE INTRAVENOUS at 13:20

## 2025-05-12 RX ADMIN — SODIUM CHLORIDE, SODIUM LACTATE, POTASSIUM CHLORIDE, AND CALCIUM CHLORIDE 100 ML/HR: 600; 310; 30; 20 INJECTION, SOLUTION INTRAVENOUS at 16:06

## 2025-05-12 RX ADMIN — MIDAZOLAM 2 MG: 1 INJECTION INTRAMUSCULAR; INTRAVENOUS at 09:38

## 2025-05-12 RX ADMIN — APREPITANT 40 MG: 40 CAPSULE ORAL at 09:37

## 2025-05-12 RX ADMIN — ORPHENADRINE CITRATE 100 MG: 100 TABLET, EXTENDED RELEASE ORAL at 21:20

## 2025-05-12 RX ADMIN — TRANEXAMIC ACID 650 MG: 10 INJECTION, SOLUTION INTRAVENOUS at 10:13

## 2025-05-12 RX ADMIN — DEXMEDETOMIDINE HYDROCHLORIDE 10 MCG: 100 INJECTION, SOLUTION, CONCENTRATE INTRAVENOUS at 13:30

## 2025-05-12 RX ADMIN — CEFAZOLIN SODIUM 2 G: 2 INJECTION, SOLUTION INTRAVENOUS at 18:03

## 2025-05-12 RX ADMIN — HYDROMORPHONE HYDROCHLORIDE 0.4 MG: 1 INJECTION, SOLUTION INTRAMUSCULAR; INTRAVENOUS; SUBCUTANEOUS at 14:27

## 2025-05-12 RX ADMIN — ROCURONIUM BROMIDE 10 MG: 10 INJECTION, SOLUTION INTRAVENOUS at 11:07

## 2025-05-12 RX ADMIN — TOBRAMYCIN SULFATE 1200 MG: 1200 INJECTION, POWDER, FOR SOLUTION INTRAVENOUS at 13:14

## 2025-05-12 RX ADMIN — ROCURONIUM BROMIDE 30 MG: 10 INJECTION, SOLUTION INTRAVENOUS at 12:01

## 2025-05-12 RX ADMIN — PROPOFOL 200 MG: 10 INJECTION, EMULSION INTRAVENOUS at 10:04

## 2025-05-12 RX ADMIN — SODIUM CHLORIDE, POTASSIUM CHLORIDE, SODIUM LACTATE AND CALCIUM CHLORIDE: 600; 310; 30; 20 INJECTION, SOLUTION INTRAVENOUS at 12:22

## 2025-05-12 RX ADMIN — FENTANYL CITRATE 50 MCG: 50 INJECTION, SOLUTION INTRAMUSCULAR; INTRAVENOUS at 10:04

## 2025-05-12 RX ADMIN — GABAPENTIN 300 MG: 300 CAPSULE ORAL at 21:20

## 2025-05-12 RX ADMIN — SODIUM CHLORIDE, POTASSIUM CHLORIDE, SODIUM LACTATE AND CALCIUM CHLORIDE: 600; 310; 30; 20 INJECTION, SOLUTION INTRAVENOUS at 09:56

## 2025-05-12 SDOH — ECONOMIC STABILITY: HOUSING INSECURITY: IN THE LAST 12 MONTHS, WAS THERE A TIME WHEN YOU WERE NOT ABLE TO PAY THE MORTGAGE OR RENT ON TIME?: NO

## 2025-05-12 SDOH — SOCIAL STABILITY: SOCIAL INSECURITY
WITHIN THE LAST YEAR, HAVE YOU BEEN KICKED, HIT, SLAPPED, OR OTHERWISE PHYSICALLY HURT BY YOUR PARTNER OR EX-PARTNER?: NO

## 2025-05-12 SDOH — HEALTH STABILITY: MENTAL HEALTH: HOW OFTEN DO YOU HAVE A DRINK CONTAINING ALCOHOL?: 2-4 TIMES A MONTH

## 2025-05-12 SDOH — ECONOMIC STABILITY: FOOD INSECURITY: WITHIN THE PAST 12 MONTHS, YOU WORRIED THAT YOUR FOOD WOULD RUN OUT BEFORE YOU GOT THE MONEY TO BUY MORE.: NEVER TRUE

## 2025-05-12 SDOH — SOCIAL STABILITY: SOCIAL INSECURITY
WITHIN THE LAST YEAR, HAVE YOU BEEN RAPED OR FORCED TO HAVE ANY KIND OF SEXUAL ACTIVITY BY YOUR PARTNER OR EX-PARTNER?: NO

## 2025-05-12 SDOH — HEALTH STABILITY: MENTAL HEALTH: HOW OFTEN DO YOU HAVE SIX OR MORE DRINKS ON ONE OCCASION?: NEVER

## 2025-05-12 SDOH — ECONOMIC STABILITY: HOUSING INSECURITY: DO YOU FEEL UNSAFE GOING BACK TO THE PLACE WHERE YOU LIVE?: NO

## 2025-05-12 SDOH — SOCIAL STABILITY: SOCIAL INSECURITY: HAVE YOU HAD ANY THOUGHTS OF HARMING ANYONE ELSE?: NO

## 2025-05-12 SDOH — ECONOMIC STABILITY: TRANSPORTATION INSECURITY: IN THE PAST 12 MONTHS, HAS LACK OF TRANSPORTATION KEPT YOU FROM MEDICAL APPOINTMENTS OR FROM GETTING MEDICATIONS?: NO

## 2025-05-12 SDOH — ECONOMIC STABILITY: FOOD INSECURITY: WITHIN THE PAST 12 MONTHS, THE FOOD YOU BOUGHT JUST DIDN'T LAST AND YOU DIDN'T HAVE MONEY TO GET MORE.: NEVER TRUE

## 2025-05-12 SDOH — SOCIAL STABILITY: SOCIAL INSECURITY: HAS ANYONE EVER THREATENED TO HURT YOUR FAMILY OR YOUR PETS?: NO

## 2025-05-12 SDOH — ECONOMIC STABILITY: INCOME INSECURITY: IN THE PAST 12 MONTHS HAS THE ELECTRIC, GAS, OIL, OR WATER COMPANY THREATENED TO SHUT OFF SERVICES IN YOUR HOME?: NO

## 2025-05-12 SDOH — ECONOMIC STABILITY: HOUSING INSECURITY: IN THE PAST 12 MONTHS, HOW MANY TIMES HAVE YOU MOVED WHERE YOU WERE LIVING?: 0

## 2025-05-12 SDOH — ECONOMIC STABILITY: FOOD INSECURITY: HOW HARD IS IT FOR YOU TO PAY FOR THE VERY BASICS LIKE FOOD, HOUSING, MEDICAL CARE, AND HEATING?: NOT HARD AT ALL

## 2025-05-12 SDOH — SOCIAL STABILITY: SOCIAL INSECURITY: WITHIN THE LAST YEAR, HAVE YOU BEEN AFRAID OF YOUR PARTNER OR EX-PARTNER?: NO

## 2025-05-12 SDOH — SOCIAL STABILITY: SOCIAL INSECURITY: ARE YOU OR HAVE YOU BEEN THREATENED OR ABUSED PHYSICALLY, EMOTIONALLY, OR SEXUALLY BY ANYONE?: NO

## 2025-05-12 SDOH — HEALTH STABILITY: MENTAL HEALTH: HOW MANY DRINKS CONTAINING ALCOHOL DO YOU HAVE ON A TYPICAL DAY WHEN YOU ARE DRINKING?: 1 OR 2

## 2025-05-12 SDOH — SOCIAL STABILITY: SOCIAL INSECURITY: WITHIN THE LAST YEAR, HAVE YOU BEEN HUMILIATED OR EMOTIONALLY ABUSED IN OTHER WAYS BY YOUR PARTNER OR EX-PARTNER?: NO

## 2025-05-12 SDOH — SOCIAL STABILITY: SOCIAL INSECURITY: DO YOU FEEL ANYONE HAS EXPLOITED OR TAKEN ADVANTAGE OF YOU FINANCIALLY OR OF YOUR PERSONAL PROPERTY?: NO

## 2025-05-12 SDOH — ECONOMIC STABILITY: HOUSING INSECURITY: AT ANY TIME IN THE PAST 12 MONTHS, WERE YOU HOMELESS OR LIVING IN A SHELTER (INCLUDING NOW)?: NO

## 2025-05-12 SDOH — SOCIAL STABILITY: SOCIAL INSECURITY: DO YOU FEEL UNSAFE GOING BACK TO THE PLACE WHERE YOU ARE LIVING?: NO

## 2025-05-12 SDOH — SOCIAL STABILITY: SOCIAL INSECURITY: ABUSE: ADULT

## 2025-05-12 SDOH — SOCIAL STABILITY: SOCIAL INSECURITY
ASK PARENT OR GUARDIAN: ARE THERE TIMES WHEN YOU, YOUR CHILD(REN), OR ANY MEMBER OF YOUR HOUSEHOLD FEEL UNSAFE, HARMED, OR THREATENED AROUND PERSONS WITH WHOM YOU KNOW OR LIVE?: NO

## 2025-05-12 SDOH — SOCIAL STABILITY: SOCIAL INSECURITY: HAVE YOU HAD THOUGHTS OF HARMING ANYONE ELSE?: NO

## 2025-05-12 SDOH — SOCIAL STABILITY: SOCIAL INSECURITY: DOES ANYONE TRY TO KEEP YOU FROM HAVING/CONTACTING OTHER FRIENDS OR DOING THINGS OUTSIDE YOUR HOME?: NO

## 2025-05-12 SDOH — SOCIAL STABILITY: SOCIAL INSECURITY: WERE YOU ABLE TO COMPLETE ALL THE BEHAVIORAL HEALTH SCREENINGS?: YES

## 2025-05-12 ASSESSMENT — PAIN - FUNCTIONAL ASSESSMENT
PAIN_FUNCTIONAL_ASSESSMENT: 0-10
PAIN_FUNCTIONAL_ASSESSMENT: WONG-BAKER FACES
PAIN_FUNCTIONAL_ASSESSMENT: 0-10

## 2025-05-12 ASSESSMENT — PAIN SCALES - GENERAL
PAINLEVEL_OUTOF10: 0 - NO PAIN
PAINLEVEL_OUTOF10: 6
PAINLEVEL_OUTOF10: 6
PAINLEVEL_OUTOF10: 2
PAINLEVEL_OUTOF10: 8
PAINLEVEL_OUTOF10: 6
PAINLEVEL_OUTOF10: 7
PAINLEVEL_OUTOF10: 3
PAINLEVEL_OUTOF10: 3
PAINLEVEL_OUTOF10: 6
PAINLEVEL_OUTOF10: 0 - NO PAIN
PAINLEVEL_OUTOF10: 6
PAINLEVEL_OUTOF10: 7
PAINLEVEL_OUTOF10: 8
PAINLEVEL_OUTOF10: 7
PAINLEVEL_OUTOF10: 3
PAINLEVEL_OUTOF10: 6

## 2025-05-12 ASSESSMENT — PAIN DESCRIPTION - DESCRIPTORS
DESCRIPTORS: ACHING
DESCRIPTORS: ACHING;PRESSURE

## 2025-05-12 ASSESSMENT — ACTIVITIES OF DAILY LIVING (ADL)
PATIENT'S MEMORY ADEQUATE TO SAFELY COMPLETE DAILY ACTIVITIES?: YES
ASSISTIVE_DEVICE: WALKER;CRUTCHES
TOILETING: NEEDS ASSISTANCE
FEEDING YOURSELF: INDEPENDENT
GROOMING: NEEDS ASSISTANCE
BATHING: NEEDS ASSISTANCE
HEARING - RIGHT EAR: FUNCTIONAL
LACK_OF_TRANSPORTATION: NO
HEARING - LEFT EAR: FUNCTIONAL
DRESSING YOURSELF: NEEDS ASSISTANCE
LACK_OF_TRANSPORTATION: NO
WALKS IN HOME: NEEDS ASSISTANCE
ADEQUATE_TO_COMPLETE_ADL: YES
JUDGMENT_ADEQUATE_SAFELY_COMPLETE_DAILY_ACTIVITIES: YES

## 2025-05-12 ASSESSMENT — PATIENT HEALTH QUESTIONNAIRE - PHQ9
1. LITTLE INTEREST OR PLEASURE IN DOING THINGS: NOT AT ALL
SUM OF ALL RESPONSES TO PHQ9 QUESTIONS 1 & 2: 0
2. FEELING DOWN, DEPRESSED OR HOPELESS: NOT AT ALL

## 2025-05-12 ASSESSMENT — COGNITIVE AND FUNCTIONAL STATUS - GENERAL
TOILETING: A LITTLE
MOVING TO AND FROM BED TO CHAIR: A LITTLE
STANDING UP FROM CHAIR USING ARMS: A LITTLE
MOVING FROM LYING ON BACK TO SITTING ON SIDE OF FLAT BED WITH BEDRAILS: A LITTLE
CLIMB 3 TO 5 STEPS WITH RAILING: A LITTLE
CLIMB 3 TO 5 STEPS WITH RAILING: A LITTLE
MOBILITY SCORE: 18
WALKING IN HOSPITAL ROOM: A LITTLE
WALKING IN HOSPITAL ROOM: A LITTLE
MOVING FROM LYING ON BACK TO SITTING ON SIDE OF FLAT BED WITH BEDRAILS: A LITTLE
DAILY ACTIVITIY SCORE: 20
MOVING TO AND FROM BED TO CHAIR: A LITTLE
EATING MEALS: A LITTLE
DRESSING REGULAR LOWER BODY CLOTHING: A LITTLE
PATIENT BASELINE BEDBOUND: NO
TOILETING: A LITTLE
PERSONAL GROOMING: A LITTLE
HELP NEEDED FOR BATHING: A LITTLE
HELP NEEDED FOR BATHING: A LITTLE
TURNING FROM BACK TO SIDE WHILE IN FLAT BAD: A LITTLE
MOBILITY SCORE: 18
TURNING FROM BACK TO SIDE WHILE IN FLAT BAD: A LITTLE
DAILY ACTIVITIY SCORE: 19
STANDING UP FROM CHAIR USING ARMS: A LITTLE
PERSONAL GROOMING: A LITTLE
DRESSING REGULAR LOWER BODY CLOTHING: A LITTLE

## 2025-05-12 ASSESSMENT — LIFESTYLE VARIABLES
AUDIT-C TOTAL SCORE: 2
PRESCIPTION_ABUSE_PAST_12_MONTHS: NO
SKIP TO QUESTIONS 9-10: 1
HOW MANY STANDARD DRINKS CONTAINING ALCOHOL DO YOU HAVE ON A TYPICAL DAY: 1 OR 2
SKIP TO QUESTIONS 9-10: 1
AUDIT-C TOTAL SCORE: 2
HOW OFTEN DO YOU HAVE A DRINK CONTAINING ALCOHOL: 2-4 TIMES A MONTH
AUDIT-C TOTAL SCORE: 2
SUBSTANCE_ABUSE_PAST_12_MONTHS: NO
HOW OFTEN DO YOU HAVE 6 OR MORE DRINKS ON ONE OCCASION: NEVER

## 2025-05-12 ASSESSMENT — COLUMBIA-SUICIDE SEVERITY RATING SCALE - C-SSRS
1. IN THE PAST MONTH, HAVE YOU WISHED YOU WERE DEAD OR WISHED YOU COULD GO TO SLEEP AND NOT WAKE UP?: NO
6. HAVE YOU EVER DONE ANYTHING, STARTED TO DO ANYTHING, OR PREPARED TO DO ANYTHING TO END YOUR LIFE?: NO
2. HAVE YOU ACTUALLY HAD ANY THOUGHTS OF KILLING YOURSELF?: NO

## 2025-05-12 ASSESSMENT — PAIN DESCRIPTION - LOCATION
LOCATION: HIP
LOCATION: HIP

## 2025-05-12 ASSESSMENT — PAIN DESCRIPTION - ORIENTATION
ORIENTATION: RIGHT
ORIENTATION: RIGHT

## 2025-05-12 NOTE — OP NOTE
OSTEOTOMY, FEMUR, derotational (R) Operative Note     Date: 2025  OR Location: [facility]    Name: Rosalva Castaneda, : 2005, Age: 20 y.o., MRN: [id number], Sex: female    Diagnosis  Pre-op Diagnosis      * Congenital hip dysplasia ([facility]HS-HCC) [Q65.89]     * Tear of right acetabular labrum, initial encounter [S73.191A]     * Femoroacetabular impingement of right hip [M25.851]     * Hemarthrosis of right hip [M25.051] Post-op Diagnosis     * Congenital hip dysplasia (HHS-HCC) [Q65.89]     * Tear of right acetabular labrum, initial encounter [S73.191A]     * Femoroacetabular impingement of right hip [M25.851]     * Hemarthrosis of right hip [M25.051]     Procedures  OSTEOTOMY, FEMUR, DISTAL  88104 - NE OSTEOTOMY FEMUR SHAFT/SUPRACONDYLAR W/FIXATION    Application with subsequent removal of uniplanar external fixator for deformity correction control    Surgeons   Panel 1:     * Keenan Fulton - Primary    Resident/Fellow/Other Assistant:  Surgeons and Role:  Panel 1:     * YOKASTA MoodyC - LORIN First Assist    Staff:   Circulator: Faye Juan Person: Willow Drew Circulator: Keyon    Anesthesia Staff: Anesthesiologist: Florence Parkinson MD  CRNA: YOLA Chaves-JULISSA  C-AA: SHU Shook    Procedure Summary  Anesthesia: General  ASA: I  Estimated Blood Loss: Please see anesthesia record  Intra-op Medications:   Administrations occurring from 1005 to 1445 on 25:   Medication Name Total Dose   EPINEPHrine (Adrenalin) 4 mg in lactated Ringer's 3,000 mL irrigation 4 mg   vancomycin (Vancocin) vial for injection 1 g   tobramycin (Nebcin) injection 1,200 mg   ceFAZolin (Ancef) 2 g in dextrose (iso)  mL 2 g   dexmedeTOMIDine (Precedex) 100 mcg/mL 2 mL single dose vial 30 mcg   fentaNYL (Sublimaze) injection 50 mcg/mL 50 mcg   HYDROmorphone (Dilaudid) injection 2 mg/mL 0.5 mg   ketorolac (Toradol) injection 30 mg 15 mg   LR bolus Cannot be calculated   ondansetron (Zofran) 2 mg/mL  injection 4 mg   rocuronium (ZeMuron) 50 mg/5 mL injection 150 mg   sugammadex (Bridion) 200 mg/2 mL injection 200 mg   tranexamic acid 1,000 mg/100 mL NS (premix) 650 mg              Anesthesia Record               Intraprocedure I/O Totals          Intake    LR bolus 1500.00 mL    Tranexamic Acid 0.00 mL    The total shown is the total volume documented since Anesthesia Start was filed.    Total Intake 1500 mL       Output    Urine 400 mL    Est. Blood Loss 150 mL    Total Output 550 mL       Net    Net Volume 950 mL          Specimen: No specimens collected              Drains and/or Catheters:   Urethral Catheter Non-latex 18 Fr. (Active)       Tourniquet Times:         Implants:  Implants       Type Name Action Serial No.      Implant SUTURE ANCHOR, FG. NANOTECK TT, 1.4MM WITH 1.2MM XBRAID - [id number] Implanted      Implant SUTURE ANCHOR, FG. NANOTECK TT, 1.4MM WITH 1.2MM XBRAID - [id number] Implanted      Implant SUTURE ANCHOR, FG. NANOTECK TT, 1.4MM WITH 1.2MM XBRAID - [id number] Implanted      Joint GUIDE WIRE, DIONICIO, 3.0MM X 1000MM - [id number] Implanted       9.0MM X 340MM T2 ALPHA PF FEMORAL NAIL, RIGHT Implanted       5.0MM X 40MM T2 ALPHA LOCKING SCREW Implanted       5.0MM X 45MM T2 ALPHA LOCKING SCREW Implanted      Screw SCREW, LOCKING, 5 X 35MM - [id number] Implanted      Implant PIN, HALF 5 X 150 SD 50/THR APEX - SN/A - [id number] Used, Not Implanted N/A     Implant MARGARITA, CONNECTING 11 X 450 HOFFMANN3 - SN/A - [id number] Used, Not Implanted N/A     Implant MARGARITA, CONNECTING 11 X 100 HOFFMANN3 - SN/A - [id number] Used, Not Implanted N/A     Screw SCREW, LOCKING, 5 X 35MM - [id number] Implanted               Findings: Please see below    Indications: Rosalva Castaneda is an 20 y.o. female who is having surgery for Congenital hip dysplasia ([facility]-HCC) [Q65.89]. She presents for correction of her femoral deformity.    The patient was seen in the preoperative area. The risks, benefits, complications, treatment  options, non-operative alternatives, expected recovery and outcomes were discussed with the patient. The possibilities of reaction to medication, pulmonary aspiration, injury to surrounding structures, bleeding, recurrent infection, the need for additional procedures, failure to diagnose a condition, and creating a complication requiring transfusion or operation were discussed with the patient. The patient concurred with the proposed plan, giving informed consent.  The site of surgery was properly noted/marked if necessary per policy. The patient has been actively warmed in preoperative area. Preoperative antibiotics have been ordered and given within 1 hours of incision. Venous thrombosis prophylaxis are not indicated.    Procedure Details: The patient was met in the preoperative holding area identified by name and medical record number.  She was transferred to the operative room positioned supine on the Guardian table after Dr. Mcneal was completed.  He had performed hip arthroscopy prior to my portion of the operation and was closed and dressed.  The drapes were removed I repositioned her in a deducted her legs and internally rotated the right femur.  She was prepped and draped in usual sterile fashion using ChloraPrep at that point.  I then made a small incision after localizing the tip of the greater trochanter with a guidewire for a Fallon piriformis start reconstruction nail.  The wire was then advanced into the appropriate start point in the proximal femur was opened.  I then advanced the ball-tipped guidewire into the distal femoral metaphysis.  We then measured the length using the guidewire and measured 355 and backed off to a 340.  We then made a percutaneous incision distally to create a vent hole in the femur and reamed up to a size 11.  We then advanced a 9 x 340 nail over the guidewire.  With the nail in place we then placed an anterior based external fixator pin proximally and an additional pin  distally.  The proximal pin was parallel to the horizon and the distal pin was internally rotated 20 degrees using geometric metallic triangles.  We then also marked out where we would make the osteotomy based on the proximal fixation block and removed the nail and wire.  We then used several drill holes at the planned osteotomy site followed by the tip oscillating saw.  With the osteotomy completed the wire was then passed across the osteotomy site followed by the ornan.  The external fixator held the rotation in place throughout insertion.  We then seated the nail fully and placed the dynamic screw proximally through percutaneous stab incision.  With the rotation of the proximal segment locked-in we then placed 2 percutaneous perfect Sac and [place] type screws distally to lock in the rotation.  We then used the internal compression  to compress the nail.  We finally secured the nail by a second static proximal cross locking screw through percutaneous stab incision to lock in the compression.  The external fixator was removed at this point as well as the insertion jig.  We then took and saved final x-rays on AP and lateral views the entire construct.  The proximal insertion was increased complexity and operative time due to the fluid infiltration and altered surgical planes from the concomitant arthroscopy in the same surgical area.  The wounds were copiously irrigated and closed in layers in a standard fashion.  Sterile dressings were applied throughout.    Postoperative plan:    Weightbearing 50% for 2 weeks per hip scope protocol.  Start physical therapy in 2 weeks.  No motion restrictions.  She may need crutches and an ambulatory assistive device beyond the 2 weeks of weightbearing restrictions given her femur osteotomy.  I will see her back in the office in 2 weeks time for a wound check with 2 views right femur.  Aspirin for DVT prophylaxis, 24 hours of IV antibiotics for infection prophylaxis, and calcium and  vitamin D for bone health.  Evidence of Infection: No   Complications:  None; patient tolerated the procedure well.    Disposition: PACU - hemodynamically stable.  Condition: stable     Additional Details: None additional    Attending Attestation: I was present and scrubbed for the key portions of the procedure.    Keenan Fulton  Phone Number: [phone]

## 2025-05-12 NOTE — NURSING NOTE
Bleeding noted through silver mepilex at the distal femoral site. Palm size amount of bleeding pooled onto her sheet. Dressing removed. Steri-strips in place with seeping drainage. 2 ABD pads placed along with ace wrap for pressure dressing with ice packs on top. See VS flowsheet for VS. Pt stated her pain went up to an 8/10. Dr. Fernandez notified of bleeding and patient's pain level at 1640. She immediately assessed her and placed orders.

## 2025-05-12 NOTE — ANESTHESIA POSTPROCEDURE EVALUATION
Patient: Rosalva Castaneda    Procedure Summary       Date: 05/12/25 Room / Location: [facility] / [facility]    Anesthesia Start: 0956 Anesthesia Stop: 1347    Procedures:       OSTEOTOMY, FEMUR, DISTAL (Right: Hip)      Reconstruction Acetabulum (Sierra insturments, sierra screws, total jpomt saw, stiletto ostieotomies,, osteotomy triangles, scope,stryjer t2 femur alpha nail PF,guardian) (Right: Hip) Diagnosis:       Congenital hip dysplasia (HHS-HCC)      Tear of right acetabular labrum, initial encounter      Femoroacetabular impingement of right hip      Hemarthrosis of right hip      (Congenital hip dysplasia (HHS-HCC) [Q65.89])      (Tear of right acetabular labrum, initial encounter [S73.191A])      (Femoroacetabular impingement of right hip [M25.851])      (Hemarthrosis of right hip [M25.051])    Surgeons: Keenan Fulton MD; Alexi Mcneal MD Responsible Provider: Florence Parkinson MD    Anesthesia Type: general ASA Status: 1            Anesthesia Type: general    Vitals Value Taken Time   BP 86/44 05/12/25 13:47   Temp 35.9 °C (96.6 °F) 05/12/25 13:47   Pulse 60 05/12/25 13:47   Resp 12 05/12/25 13:47   SpO2 100 % 05/12/25 13:47       Anesthesia Post Evaluation    Patient location during evaluation: bedside  Patient participation: complete - patient participated  Level of consciousness: awake and alert  Pain management: adequate  Multimodal analgesia pain management approach  Airway patency: patent  Cardiovascular status: acceptable  Respiratory status: acceptable  Hydration status: acceptable  Postoperative Nausea and Vomiting: none        There were no known notable events for this encounter.

## 2025-05-12 NOTE — BRIEF OP NOTE
Date: 2025  OR Location: [facility]    Name: Rosalva Castaneda, : 2005, Age: 20 y.o., MRN: [id number], Sex: female    Diagnosis  Pre-op Diagnosis      * Congenital hip dysplasia (HHS-HCC) [Q65.89]     * Tear of right acetabular labrum, initial encounter [S73.191A]     * Femoroacetabular impingement of right hip [M25.851]     * Hemarthrosis of right hip [M25.051] Post-op Diagnosis     * Congenital hip dysplasia (HHS-HCC) [Q65.89]     * Tear of right acetabular labrum, initial encounter [S73.191A]     * Femoroacetabular impingement of right hip [M25.851]     * Hemarthrosis of right hip [M25.051]     Procedures  OSTEOTOMY, FEMUR, DISTAL  28202 - ID OSTEOTOMY FEMUR SHAFT/SUPRACONDYLAR W/FIXATION    Reconstruction Acetabulum (Sierra insturments, sierra screws, total jpomt saw, stiletto ostieotomies,, osteotomy triangles, scope,stryjer t2 femur alpha nail PF,guardian)  29797 - ID ARTHROSCOPY HIP W/ACETABULOPLASTY    ID APPLICATION UNIPLANE EXTERNAL FIXATION SYSTEM [24144]  ID ARTHROSCOPY HIP W/FEMOROPLASTY [71754]  ID ARTHROSCOPY HIP W/LABRAL REPAIR [42744]  ID ARTHROSCOPY HIP SURGICAL W/REMOVAL LOOSE/FB [19199]  ID UNLISTED PROCEDURE ARTHROSCOPY [45590]  Surgeons   Panel 1:     * Keenan Fulton - Primary  Panel 2:     * Alexi Mcneal - Primary    Resident/Fellow/Other Assistant:  Surgeons and Role:  Panel 1:     * YOKASTA MoodyC - LORIN First Assist  Panel 2:     * YOKASTA SoniC Aron LORIN First Assist    Staff:   Circulator: Faye Juan Person: Willow Drew Circulator: Keyon    Anesthesia Staff: Anesthesiologist: Florence Parkinson MD  CRNA: YOLA Chaves-CRNA  C-AA: SHU Shook    Procedure Summary  Anesthesia: General  ASA: I  Estimated Blood Loss: 5mL  Intra-op Medications:   Administrations occurring from 1005 to 1445 on 25:   Medication Name Total Dose   EPINEPHrine (Adrenalin) 4 mg in lactated Ringer's 3,000 mL irrigation 4 mg   LR bolus Cannot be calculated   rocuronium  (ZeMuron) 50 mg/5 mL injection 120 mg   tranexamic acid 1,000 mg/100 mL NS (premix) 650 mg   ceFAZolin (Ancef) 2 g in dextrose (iso)  mL 2 g              Anesthesia Record               Intraprocedure I/O Totals          Intake    LR bolus 1000.00 mL    Tranexamic Acid 0.00 mL    The total shown is the total volume documented since Anesthesia Start was filed.    Total Intake 1000 mL       Output    Urine 400 mL    Total Output 400 mL       Net    Net Volume 600 mL          Specimen: No specimens collected               Findings: anterosuperior labral tear    Complications:  None; patient tolerated the procedure well.     Disposition: PACU - hemodynamically stable.  Condition: stable  Specimens Collected: No specimens collected  Attending Attestation: I was present and scrubbed for the entire procedure.    Keenan Fulton  Phone Number: [phone]

## 2025-05-12 NOTE — CARE PLAN
POD0 s/p right hip arthroscopy and derotational femoral osteotomy with Kim Mcneal and Kirstin.     Activity: PT/OT  Antibiotics: ancef x24 hours  Diet: regular  Dressing: change prior to DC  DVT Ppx: Aspirin 81mg BID  Vogel: remove when ambulatory.   Pain: multimodal  Weight Bearing: TTWB RLE  Dispo: pending clinical course. Likely POD2  Follow Up: in two weeks from surgery with LORIN    Antonio Herbert MD  Sports Medicine Fellow  Orthopaedic Surgery

## 2025-05-12 NOTE — CONSULTS
Inpatient consult to Medicine  Consult performed by: Gianna Fernandez MD  Consult ordered by: Flori Herring PA-C          Reason For Consult  Medical management of postop pain, ADHD.    History Of Present Illness  Rosalva Castaneda is a 20 y.o. female who is postop day 0 status post right periacetabular osteotomy secondary to congenital hip dysplasia.  Postoperatively, patient is complaining of significant hip pain which was not relieved with 0.6 mg of Dilaudid in PACU.  She was just given an additional 0.5 mg of IV Dilaudid and currently rates her pain 6.5/10.  Denies any other complaints at this time aside from pain.  Parents are present at bedside, all questions answered.  Vital signs are stable postoperatively.     Past Medical History  She has a past medical history of ADHD and Migraines.    Surgical History  She has a past surgical history that includes Dental surgery.     Social History  She reports that she has never smoked. She has quit using smokeless tobacco. She reports current alcohol use. She reports that she does not use drugs.    Family History  Family History[1]     Allergies  Patient has no known allergies.    Review of Systems   All other systems reviewed and are negative.       Physical Exam  Constitutional:       General: She is not in acute distress.     Appearance: Normal appearance.   HENT:      Head: Normocephalic and atraumatic.      Nose: Nose normal.      Mouth/Throat:      Mouth: Mucous membranes are moist.      Pharynx: Oropharynx is clear.   Cardiovascular:      Rate and Rhythm: Normal rate and regular rhythm.   Pulmonary:      Effort: Pulmonary effort is normal. No respiratory distress.      Breath sounds: Normal breath sounds.   Abdominal:      General: Bowel sounds are normal.      Palpations: Abdomen is soft.      Tenderness: There is no abdominal tenderness. There is no rebound.   Musculoskeletal:         General: No swelling, deformity or signs of injury.      Cervical back:  Normal range of motion and neck supple.   Skin:     General: Skin is warm and dry.   Neurological:      General: No focal deficit present.      Mental Status: She is alert and oriented to person, place, and time. Mental status is at baseline.   Psychiatric:         Attention and Perception: Attention and perception normal.         Mood and Affect: Mood normal.         Behavior: Behavior normal.         Judgment: Judgment normal.          Last Recorded Vitals  /65 (BP Location: Left arm, Patient Position: Lying)   Pulse 70   Temp 36.6 °C (97.9 °F) (Temporal)   Resp 16   Wt 68.9 kg (152 lb)   SpO2 100%     Relevant Results  Results for orders placed or performed during the hospital encounter of 05/12/25 (from the past 24 hours)   hCG, Urine, Qualitative   Result Value Ref Range    HCG, Urine NEGATIVE NEGATIVE   Type And Screen   Result Value Ref Range    ABO TYPE O     Rh TYPE POS     ANTIBODY SCREEN NEG             Assessment/Plan     Congenital hip dysplasia  Management per primary  PT/OT  Pain control, bowel regimen  Antibiotic and DVT prophylaxis per primary  ADHD  Continue Adderall     Gianna Fernandez MD             [1] No family history on file.

## 2025-05-12 NOTE — CARE PLAN
The patient's goals for the shift include pain control    The clinical goals for the shift include pain control, maintain hemodynamic stability    Dr. Fernandez ordered PRN pain medication when needed. Pt has been monitored for hemodynamic stability w/ VS, labs, and skin assessments.

## 2025-05-12 NOTE — CARE PLAN
Patient resting comfortably at time of exam. Pain is controlled. No nausea. Chart reviewed.  Vital signs are stable.  Nursing notes there is still some bleeding through her bandages. Dr. Fulton is aware.

## 2025-05-12 NOTE — PROGRESS NOTES
20 yr old female admitted inpatient following   - [place] OSTEOTOMY FEMUR SHAFT/SUPRACONDYLAR W/FIXATION with Dr. Fulton.  Plan is home within 1-2 days.  OPT starting on May 29, 2025 at 11 am-[facility]  Post op follow up on May 28, 2025 at 11:15 am-[facility].  Family to transport and assist with care at home as needed.      05/12/25 1612   Discharge Planning   Living Arrangements Parent   Assistance Needed transportation   Type of Residence Private residence   Home or Post Acute Services None   Expected Discharge Disposition Home   Does the patient need discharge transport arranged? No   Financial Resource Strain   How hard is it for you to pay for the very basics like food, housing, medical care, and heating? Not hard   Housing Stability   In the last 12 months, was there a time when you were not able to pay the mortgage or rent on time? N   In the past 12 months, how many times have you moved where you were living? 0   At any time in the past 12 months, were you homeless or living in a shelter (including now)? N   Transportation Needs   In the past 12 months, has lack of transportation kept you from medical appointments or from getting medications? no   In the past 12 months, has lack of transportation kept you from meetings, work, or from getting things needed for daily living? No   Patient Choice   Provider Choice list and CMS website (https://medicare.gov/care-compare#search) for post-acute Quality and Resource Measure Data were provided and reviewed with:   (n/a)   Patient / Family choosing to utilize agency / facility established prior to hospitalization No   Stroke Family Assessment   Stroke Family Assessment Needed No

## 2025-05-12 NOTE — NURSING NOTE
Dr. Mcneal and Dr. Fulton notified of patient's bleeding event with interventions. No further orders received at this time.

## 2025-05-12 NOTE — NURSING NOTE
Vitals:    05/12/25 1834   BP: 119/73   Pulse: 77   Resp: 16   Temp:    SpO2: 97%     Assumed care of patient. Patient had right hip/leg surgery; Dr. Fulton. Patient in bed reporting minimal pain; tolerable. Patient had a slow bleed at the femur surgical site. Dressing was change; silver dressing and ace wrap; ice pack  applied. Reviewed plan of care for the shift; no concerns. Family at the bedside. Vital sign stable and IV fluids are infusing. Bed in low and locked position; call light within reach; bed alarm activated. Will continue to monitor treatment progress.

## 2025-05-12 NOTE — ANESTHESIA PREPROCEDURE EVALUATION
Patient: Rosalva Castaneda    Procedure Information       Date/Time: 05/12/25 1005    Procedures:       OSTEOTOMY, FEMUR, DISTAL (Right: Hip)      Reconstruction Acetabulum (Sierra insturments, sierra screws, total jpomt saw, stiletto ostieotomies,, osteotomy triangles, scope,stryjer t2 femur alpha nail PF,guardian) (Right: Hip)    Location: [facility] / [facility]    Surgeons: Keenan Fulton MD; Alexi Mcneal MD            Relevant Problems   Neuro  ADHD   (+) Chronic headaches     Past Medical History:   Diagnosis Date   • ADHD    • Migraines        Past Surgical History:   Procedure Laterality Date   • DENTAL SURGERY         No Known Allergies    Clinical information reviewed:   Tobacco  Allergies  Meds   Med Hx  Surg Hx   Fam Hx  Soc Hx        NPO Detail:  NPO/Void Status  Date of Last Liquid: 05/11/25  Time of Last Liquid: 2100  Date of Last Solid: 05/11/25  Time of Last Solid: 2100  Last Intake Type: Light meal  Time of Last Void: 0842         Physical Exam    Airway  Mallampati: I  TM distance: >3 FB  Neck ROM: full  Mouth opening: 3 or more finger widths     Cardiovascular    Dental    Pulmonary    Abdominal          Anesthesia Plan    History of general anesthesia?: yes  History of complications of general anesthesia?: no    ASA 1     general and regional     intravenous induction   Postoperative pain plan includes opioids.  Trial extubation is planned.  Anesthetic plan and risks discussed with patient.  Use of blood products discussed with patient who.    Plan discussed with CRNA and CAA.

## 2025-05-12 NOTE — PROGRESS NOTES
Therapy Communication Note    Patient Name: Rosalva Castaneda  MRN: [id number]  Department: [facility]  Room: 221[facility]  Today's Date: 5/12/2025     Discipline: Physical Therapy      Missed Time: Cancel    Comment: PT eval orders received; chart reviewed. Spoke with RN who informed PT that the pt has bled through her post-op dressing and is not appropriate for PT eval at this time. PT will attempt eval when medically cleared. RN aware.       Mary Lofton, PT, DPT

## 2025-05-12 NOTE — CARE PLAN
The patient's goals for the shift include pain control    The clinical goals for the shift include pain control, maintain hemodynamic stability    Monitoring vital signs, maintaining hydration, and monitoring bleeding at this time. Dr. Fernandez notified of any changes or need for PRN pain medications.

## 2025-05-12 NOTE — ANESTHESIA PROCEDURE NOTES
Airway  Date/Time: 5/12/2025 10:07 AM  Reason: elective    Airway not difficult    Staffing  Performed: CRNA   Authorized by: Florence Parkinson MD    Performed by: YOLA Chaves-JULISSA  Patient location during procedure: OR    Patient Condition  Indications for airway management: anesthesia  Patient position: sniffing  Sedation level: deep     Final Airway Details   Preoxygenated: yes  Final airway type: endotracheal airway  Successful airway: ETT   Successful intubation technique: direct laryngoscopy  Endotracheal tube insertion site: oral  Blade: Alpesh  Blade size: #3  ETT size (mm): 6.5  Cormack-Lehane Classification: grade I - full view of glottis  Placement verified by: capnometry   Measured from: lips  ETT to lips (cm): 22  Number of attempts at approach: 1          
Peripheral Block    Patient location during procedure: pre-op  Medication administered at: 5/12/2025 9:42 AM  End time: 5/12/2025 9:44 AM  Reason for block: at surgeon's request and post-op pain management  Staffing  Performed: attending   Authorized by: Florence Parkinson MD    Performed by: Florence Parkinson MD  Preanesthetic Checklist  Completed: patient identified, IV checked, site marked, risks and benefits discussed, surgical consent, monitors and equipment checked, pre-op evaluation and timeout performed   Timeout performed at: 5/12/2025 9:36 AM  Peripheral Block  Patient position: laying flat  Prep: ChloraPrep  Patient monitoring: heart rate, cardiac monitor and continuous pulse ox  Block type: QL  Laterality: right  Injection technique: single-shot  Guidance: ultrasound guided  Local infiltration: ropivacaine  Infiltration strength: 0.5 %  Dose: 20 mL  Needle  Needle gauge: 21 G  Needle length: 10 cm  Needle localization: ultrasound guidance     image stored in chart  Assessment  Injection assessment: negative aspiration for heme, no paresthesia on injection, incremental injection and local visualized surrounding nerve on ultrasound  Paresthesia pain: none  Heart rate change: no  Slow fractionated injection: yes  Additional Notes  Time out was performed with block nurse.  Site was prepped with chlorhexadine scrub.  Needle was inserted under ultrasound guidance and advanced to the correct position.  20mL Ropivacaine with 5mg Decadron was injected in 5cc increments with negative aspiration every 5ccs.  The patient tolerated the procedure well.  No complications.          
Peripheral Block    Patient location during procedure: pre-op  Medication administered at: 5/12/2025 9:47 AM  End time: 5/12/2025 9:46 AM  Reason for block: at surgeon's request and post-op pain management  Staffing  Performed: attending   Authorized by: Florence Parkinson MD    Performed by: Florence Parkinson MD  Preanesthetic Checklist  Completed: patient identified, IV checked, site marked, risks and benefits discussed, surgical consent, monitors and equipment checked, pre-op evaluation and timeout performed   Timeout performed at: 5/12/2025 9:36 AM  Peripheral Block  Patient position: laying flat  Prep: ChloraPrep  Patient monitoring: heart rate, continuous pulse ox and cardiac monitor  Block type: PENG  Laterality: right  Injection technique: single-shot  Guidance: ultrasound guided  Local infiltration: ropivacaine  Infiltration strength: 0.5 %  Dose: 20 mL  Needle  Needle gauge: 21 G  Needle length: 10 cm  Needle localization: ultrasound guidance     image stored in chart  Assessment  Injection assessment: incremental injection, negative aspiration for heme, local visualized surrounding nerve on ultrasound and no paresthesia on injection  Paresthesia pain: none  Heart rate change: no  Slow fractionated injection: yes  Additional Notes  Time out was performed with block nurse.  Site was prepped with chlorhexadine scrub.  Needle was inserted under ultrasound guidance and advanced to the correct position.  20mL Ropivacaine with 5mg Decadron was injected in 5cc increments with negative aspiration every 5ccs.  The patient tolerated the procedure well.  No complications.          
Name band;

## 2025-05-13 ENCOUNTER — APPOINTMENT (OUTPATIENT)
Dept: CARDIOLOGY | Facility: HOSPITAL | Age: 20
End: 2025-05-13
Payer: COMMERCIAL

## 2025-05-13 ENCOUNTER — PHARMACY VISIT (OUTPATIENT)
Dept: PHARMACY | Facility: CLINIC | Age: 20
End: 2025-05-13
Payer: COMMERCIAL

## 2025-05-13 LAB
ANION GAP SERPL CALC-SCNC: 10 MMOL/L (ref 10–20)
BUN SERPL-MCNC: 9 MG/DL (ref 6–23)
CALCIUM SERPL-MCNC: 8.6 MG/DL (ref 8.6–10.3)
CHLORIDE SERPL-SCNC: 104 MMOL/L (ref 98–107)
CO2 SERPL-SCNC: 27 MMOL/L (ref 21–32)
CREAT SERPL-MCNC: 0.72 MG/DL (ref 0.5–1.05)
EGFRCR SERPLBLD CKD-EPI 2021: >90 ML/MIN/1.73M*2
ERYTHROCYTE [DISTWIDTH] IN BLOOD BY AUTOMATED COUNT: 11.9 % (ref 11.5–14.5)
GLUCOSE SERPL-MCNC: 119 MG/DL (ref 74–99)
HCT VFR BLD AUTO: 33 % (ref 36–46)
HGB BLD-MCNC: 11.1 G/DL (ref 12–16)
MCH RBC QN AUTO: 29.7 PG (ref 26–34)
MCHC RBC AUTO-ENTMCNC: 33.6 G/DL (ref 32–36)
MCV RBC AUTO: 88 FL (ref 80–100)
NRBC BLD-RTO: ABNORMAL /100{WBCS}
PLATELET # BLD AUTO: 247 X10*3/UL (ref 150–450)
POTASSIUM SERPL-SCNC: 4.2 MMOL/L (ref 3.5–5.3)
RBC # BLD AUTO: 3.74 X10*6/UL (ref 4–5.2)
SODIUM SERPL-SCNC: 137 MMOL/L (ref 136–145)
WBC # BLD AUTO: 19.7 X10*3/UL (ref 4.4–11.3)

## 2025-05-13 PROCEDURE — 93005 ELECTROCARDIOGRAM TRACING: CPT

## 2025-05-13 PROCEDURE — 2500000002 HC RX 250 W HCPCS SELF ADMINISTERED DRUGS (ALT 637 FOR MEDICARE OP, ALT 636 FOR OP/ED): Performed by: PHYSICIAN ASSISTANT

## 2025-05-13 PROCEDURE — 85027 COMPLETE CBC AUTOMATED: CPT | Performed by: PHYSICIAN ASSISTANT

## 2025-05-13 PROCEDURE — 2500000001 HC RX 250 WO HCPCS SELF ADMINISTERED DRUGS (ALT 637 FOR MEDICARE OP): Performed by: PHYSICIAN ASSISTANT

## 2025-05-13 PROCEDURE — 97116 GAIT TRAINING THERAPY: CPT | Mod: GP

## 2025-05-13 PROCEDURE — 80048 BASIC METABOLIC PNL TOTAL CA: CPT | Performed by: PHYSICIAN ASSISTANT

## 2025-05-13 PROCEDURE — 1100000001 HC PRIVATE ROOM DAILY

## 2025-05-13 PROCEDURE — 2500000004 HC RX 250 GENERAL PHARMACY W/ HCPCS (ALT 636 FOR OP/ED): Mod: JZ | Performed by: PHYSICIAN ASSISTANT

## 2025-05-13 PROCEDURE — 36415 COLL VENOUS BLD VENIPUNCTURE: CPT | Performed by: PHYSICIAN ASSISTANT

## 2025-05-13 PROCEDURE — 97161 PT EVAL LOW COMPLEX 20 MIN: CPT | Mod: GP

## 2025-05-13 PROCEDURE — 99221 1ST HOSP IP/OBS SF/LOW 40: CPT | Performed by: EMERGENCY MEDICINE

## 2025-05-13 PROCEDURE — RXMED WILLOW AMBULATORY MEDICATION CHARGE

## 2025-05-13 RX ORDER — POLYETHYLENE GLYCOL 3350 17 G/17G
17 POWDER, FOR SOLUTION ORAL DAILY
Qty: 10 PACKET | Refills: 0 | Status: SHIPPED | OUTPATIENT
Start: 2025-05-13 | End: 2025-05-23

## 2025-05-13 RX ORDER — ACETAMINOPHEN 325 MG/1
650 TABLET ORAL EVERY 6 HOURS
Qty: 60 TABLET | Refills: 0 | Status: SHIPPED | OUTPATIENT
Start: 2025-05-13 | End: 2025-06-12

## 2025-05-13 RX ORDER — ASPIRIN 81 MG/1
81 TABLET ORAL 2 TIMES DAILY
Qty: 60 TABLET | Refills: 0 | Status: SHIPPED | OUTPATIENT
Start: 2025-05-13 | End: 2025-06-12

## 2025-05-13 RX ORDER — PANTOPRAZOLE SODIUM 40 MG/1
40 TABLET, DELAYED RELEASE ORAL
Qty: 30 TABLET | Refills: 0 | Status: SHIPPED | OUTPATIENT
Start: 2025-05-13 | End: 2025-06-12

## 2025-05-13 RX ORDER — ERGOCALCIFEROL 1.25 MG/1
1.25 CAPSULE ORAL
Qty: 8 CAPSULE | Refills: 0 | Status: SHIPPED | OUTPATIENT
Start: 2025-05-18 | End: 2025-07-13

## 2025-05-13 RX ORDER — AMOXICILLIN 250 MG
1 CAPSULE ORAL 2 TIMES DAILY
Qty: 30 TABLET | Refills: 0 | Status: SHIPPED | OUTPATIENT
Start: 2025-05-13 | End: 2025-05-28

## 2025-05-13 RX ORDER — CYCLOBENZAPRINE HCL 10 MG
10 TABLET ORAL 3 TIMES DAILY PRN
Qty: 21 TABLET | Refills: 0 | Status: SHIPPED | OUTPATIENT
Start: 2025-05-13 | End: 2025-05-20

## 2025-05-13 RX ORDER — ONDANSETRON 4 MG/1
4 TABLET, FILM COATED ORAL EVERY 8 HOURS PRN
Qty: 30 TABLET | Refills: 1 | Status: SHIPPED | OUTPATIENT
Start: 2025-05-13 | End: 2025-06-12

## 2025-05-13 RX ORDER — GABAPENTIN 300 MG/1
300 CAPSULE ORAL 3 TIMES DAILY
Qty: 90 CAPSULE | Refills: 0 | Status: SHIPPED | OUTPATIENT
Start: 2025-05-13 | End: 2025-06-12

## 2025-05-13 RX ORDER — CELECOXIB 100 MG/1
100 CAPSULE ORAL DAILY
Qty: 10 CAPSULE | Refills: 0 | Status: SHIPPED | OUTPATIENT
Start: 2025-05-13 | End: 2025-05-23

## 2025-05-13 RX ORDER — OXYCODONE HYDROCHLORIDE 5 MG/1
5 TABLET ORAL EVERY 4 HOURS PRN
Qty: 42 TABLET | Refills: 0 | Status: SHIPPED | OUTPATIENT
Start: 2025-05-13 | End: 2025-05-20

## 2025-05-13 RX ADMIN — CELECOXIB 200 MG: 200 CAPSULE ORAL at 09:00

## 2025-05-13 RX ADMIN — SODIUM CHLORIDE, SODIUM LACTATE, POTASSIUM CHLORIDE, AND CALCIUM CHLORIDE 100 ML/HR: 600; 310; 30; 20 INJECTION, SOLUTION INTRAVENOUS at 04:00

## 2025-05-13 RX ADMIN — ACETAMINOPHEN 1000 MG: 500 TABLET, FILM COATED ORAL at 09:00

## 2025-05-13 RX ADMIN — OXYCODONE HYDROCHLORIDE 5 MG: 5 TABLET ORAL at 20:32

## 2025-05-13 RX ADMIN — OXYCODONE HYDROCHLORIDE 5 MG: 5 TABLET ORAL at 15:39

## 2025-05-13 RX ADMIN — CEFAZOLIN SODIUM 2 G: 2 INJECTION, SOLUTION INTRAVENOUS at 02:23

## 2025-05-13 RX ADMIN — ASPIRIN 81 MG: 81 TABLET, COATED ORAL at 20:32

## 2025-05-13 RX ADMIN — ACETAMINOPHEN 1000 MG: 500 TABLET, FILM COATED ORAL at 21:57

## 2025-05-13 RX ADMIN — MORPHINE SULFATE 2 MG: 2 INJECTION, SOLUTION INTRAMUSCULAR; INTRAVENOUS at 18:12

## 2025-05-13 RX ADMIN — ORPHENADRINE CITRATE 100 MG: 100 TABLET, EXTENDED RELEASE ORAL at 09:00

## 2025-05-13 RX ADMIN — ACETAMINOPHEN 1000 MG: 500 TABLET, FILM COATED ORAL at 15:39

## 2025-05-13 RX ADMIN — POLYETHYLENE GLYCOL 3350 17 G: 17 POWDER, FOR SOLUTION ORAL at 08:59

## 2025-05-13 RX ADMIN — OXYCODONE HYDROCHLORIDE 5 MG: 5 TABLET ORAL at 09:00

## 2025-05-13 RX ADMIN — SENNOSIDES 17.2 MG: 8.6 TABLET, FILM COATED ORAL at 09:00

## 2025-05-13 RX ADMIN — OXYCODONE HYDROCHLORIDE 5 MG: 5 TABLET ORAL at 02:42

## 2025-05-13 RX ADMIN — ORPHENADRINE CITRATE 100 MG: 100 TABLET, EXTENDED RELEASE ORAL at 20:32

## 2025-05-13 RX ADMIN — MORPHINE SULFATE 2 MG: 2 INJECTION, SOLUTION INTRAMUSCULAR; INTRAVENOUS at 00:12

## 2025-05-13 RX ADMIN — ACETAMINOPHEN 1000 MG: 500 TABLET, FILM COATED ORAL at 02:42

## 2025-05-13 RX ADMIN — SENNOSIDES 17.2 MG: 8.6 TABLET, FILM COATED ORAL at 20:32

## 2025-05-13 RX ADMIN — ASPIRIN 81 MG: 81 TABLET, COATED ORAL at 08:59

## 2025-05-13 RX ADMIN — MORPHINE SULFATE 2 MG: 2 INJECTION, SOLUTION INTRAMUSCULAR; INTRAVENOUS at 12:31

## 2025-05-13 RX ADMIN — GABAPENTIN 300 MG: 300 CAPSULE ORAL at 09:00

## 2025-05-13 RX ADMIN — POLYETHYLENE GLYCOL 3350 17 G: 17 POWDER, FOR SOLUTION ORAL at 20:32

## 2025-05-13 RX ADMIN — GABAPENTIN 300 MG: 300 CAPSULE ORAL at 15:39

## 2025-05-13 RX ADMIN — GABAPENTIN 300 MG: 300 CAPSULE ORAL at 20:32

## 2025-05-13 RX ADMIN — MORPHINE SULFATE 2 MG: 2 INJECTION, SOLUTION INTRAMUSCULAR; INTRAVENOUS at 06:40

## 2025-05-13 RX ADMIN — CHOLECALCIFEROL CAP 1.25 MG (50000 UNIT) 1.25 MG: 1.25 CAP at 08:59

## 2025-05-13 RX ADMIN — PANTOPRAZOLE SODIUM 40 MG: 40 TABLET, DELAYED RELEASE ORAL at 06:40

## 2025-05-13 ASSESSMENT — PAIN DESCRIPTION - DESCRIPTORS
DESCRIPTORS: ACHING
DESCRIPTORS: DULL;ACHING
DESCRIPTORS: ACHING
DESCRIPTORS: ACHING;SORE
DESCRIPTORS: ACHING
DESCRIPTORS: ACHING
DESCRIPTORS: DULL;ACHING

## 2025-05-13 ASSESSMENT — PAIN SCALES - GENERAL
PAINLEVEL_OUTOF10: 4
PAINLEVEL_OUTOF10: 4
PAINLEVEL_OUTOF10: 6
PAINLEVEL_OUTOF10: 4
PAINLEVEL_OUTOF10: 6
PAINLEVEL_OUTOF10: 8
PAINLEVEL_OUTOF10: 4
PAINLEVEL_OUTOF10: 6
PAINLEVEL_OUTOF10: 8
PAINLEVEL_OUTOF10: 5 - MODERATE PAIN

## 2025-05-13 ASSESSMENT — COGNITIVE AND FUNCTIONAL STATUS - GENERAL
DAILY ACTIVITIY SCORE: 21
MOVING TO AND FROM BED TO CHAIR: A LITTLE
HELP NEEDED FOR BATHING: A LITTLE
MOVING TO AND FROM BED TO CHAIR: A LITTLE
CLIMB 3 TO 5 STEPS WITH RAILING: A LITTLE
MOBILITY SCORE: 18
STANDING UP FROM CHAIR USING ARMS: A LITTLE
DRESSING REGULAR LOWER BODY CLOTHING: A LITTLE
TURNING FROM BACK TO SIDE WHILE IN FLAT BAD: A LITTLE
MOVING FROM LYING ON BACK TO SITTING ON SIDE OF FLAT BED WITH BEDRAILS: A LITTLE
MOVING FROM LYING ON BACK TO SITTING ON SIDE OF FLAT BED WITH BEDRAILS: A LITTLE
MOBILITY SCORE: 19
CLIMB 3 TO 5 STEPS WITH RAILING: A LITTLE
TURNING FROM BACK TO SIDE WHILE IN FLAT BAD: A LITTLE
TOILETING: A LITTLE
STANDING UP FROM CHAIR USING ARMS: A LITTLE
WALKING IN HOSPITAL ROOM: A LITTLE

## 2025-05-13 ASSESSMENT — PAIN - FUNCTIONAL ASSESSMENT
PAIN_FUNCTIONAL_ASSESSMENT: 0-10

## 2025-05-13 ASSESSMENT — PAIN DESCRIPTION - ORIENTATION: ORIENTATION: RIGHT

## 2025-05-13 ASSESSMENT — PAIN DESCRIPTION - LOCATION: LOCATION: HIP

## 2025-05-13 ASSESSMENT — ACTIVITIES OF DAILY LIVING (ADL): ADL_ASSISTANCE: INDEPENDENT

## 2025-05-13 NOTE — NURSING NOTE
Vitals:    05/13/25 0016   BP: 96/52   Pulse: 61   Resp: 16   Temp: 36.5 °C (97.7 °F)   SpO2: 98%     Patient in bed resting; no complaints of pain. Vitals stable; IV fluids infusing. Bed in low and locked position; call light within reach; bed alarm activated. Will continue to monitor treatment progress.

## 2025-05-13 NOTE — CARE PLAN
The patient's goals for the shift include Pain Control    The clinical goals for the shift include Pain Control, maintain hemodnamic stability

## 2025-05-13 NOTE — PROGRESS NOTES
Physical Therapy Evaluation & Treatment    Patient Name: Rosalva Castaneda  MRN: [id number]  Department: [facility]  Room: [address] [place], [place] [address]  Today's Date: 5/13/2025   Time Calculation  Start Time: 0934  Stop Time: 1010  Time Calculation (min): 36 min    Assessment/Plan   PT Assessment  PT Assessment Results: Decreased range of motion, Decreased strength, Decreased mobility, Impaired balance, Decreased coordination, Orthopedic restrictions, Pain  Rehab Prognosis: Good  Barriers to Discharge Home: Physical needs  Physical Needs: Intermittent ADL assistance needed  Evaluation/Treatment Tolerance: Patient tolerated treatment well  Medical Staff Made Aware: Yes  End of Session Communication: Bedside nurse, Physician (DR. WILL aware of dressing needs)  Assessment Comment: PT eval low . Pt presenting to eval with deficits in functional mobility, impaired ROM, and increased pain level. Pt completed bed mobility, transfers, and ambulation today with assist x1, no buckle or overt LOB but some unsteadiness noted with balance in standing at first. Pt did ambulate with walker and crutches today. PT recommends outpatient therapy with 24 hour supervision/assistance for continued improvement in mobility, strength, and pain management..    End of Session Patient Position: Up in chair, Alarm on (ice on R hip, call bell inreach, all neesd met)   IP OR SWING BED PT PLAN  Inpatient or Swing Bed: Inpatient  PT Plan  Treatment/Interventions: Bed mobility, Transfer training, Gait training, Endurance training, Range of motion, Positioning  PT Plan: Ongoing PT  PT Frequency: 5 times per week  PT Discharge Recommendations: Low intensity level of continued care  Equipment Recommended upon Discharge: Crutches, Wheeled walker (leg )  PT Recommended Transfer Status: Assist x1, Assistive device      Subjective     General Visit Information:  General  Reason for Referral: R acetabular reconstruction and derotational femur osteotomy  Referred By:   Kirstin  Past Medical History Relevant to Rehab: migraines, hip dysplasia, ADHD, ex tobacco use  Family/Caregiver Present: Yes  Prior to Session Communication: Bedside nurse  Patient Position Received: Bed, 3 rail up  General Comment: cleared by RN and agreeable to session  Home Living:  Home Living  Type of Home: House  Lives With: Parent(s)  Home Adaptive Equipment: None (will needcrutches and leg  for DC)  Home Layout: Multi-level, Stairs to alternate level with rails  Alternate Level Stairs-Number of Steps: 12 steps with railing to bedroom but can stay on first floor  Home Access: Stairs to enter with rails  Entrance Stairs-Number of Steps: 5 steps  Home Living Comments: mom to assista t home - [place]  Prior Level of Function:  Prior Function Per Pt/Caregiver Report  Level of Travis: Independent with ADLs and functional transfers  Receives Help From: Family  ADL Assistance: Independent  Homemaking Assistance: Independent  Ambulatory Assistance: Independent  Prior Function Comments: no falls within last 6 months  Precautions:  Precautions  LE Weight Bearing Status: Right Partial Weight Bearing (50% WB on RLE for 2 weeks and then progress to WBAT on RLE after)  Medical Precautions: Fall precautions           Objective   Pain:  Pain Assessment  Pain Assessment: 0-10  0-10 (Numeric) Pain Score: 6  Pain Type: Surgical pain  Pain Location: Hip  Pain Orientation: Right  Pain Radiating Towards: thigh, deep pain  Pain Descriptors: Aching  Pain Frequency: Intermittent  Pain Onset: Sudden  Clinical Progression: Gradually improving  Pain Interventions: Cold applied, Rest, Repositioned, Ambulation/increased activity, Elevated  Response to Interventions: Content/relaxed  Cognition:  Cognition  Overall Cognitive Status: Within Functional Limits  Orientation Level: Oriented X4  Attention: Within Functional Limits  Memory: Within Funtional Limits  Problem Solving: Within Functional Limits  Numeric Reasoning: Within  Functional Limits  Abstract Reasoning: Within Functional Limits  Safety/Judgement: Within Functional Limits  Insight: Within function limits  Impulsive: Within functional limits  Processing Speed: Within funtional limits    General Assessments:  General Observation  General Observation: dressing dry and intact on R hip and lower back, R groin region however R lateral calf dressing showing sanguinous discharge of old and new content. RN made aware.               Activity Tolerance  Endurance: Endurance does not limit participation in activity    Sensation  Light Touch: No apparent deficits            Perception  Inattention/Neglect: Appears intact  Initiation: Appears intact  Motor Planning: Appears intact  Perseveration: Not present      Coordination  Movements are Fluid and Coordinated: No  Lower Body Coordination: impaired from post op limitations    Postural Control  Postural Control: Within Functional Limits    Static Sitting Balance  Static Sitting-Balance Support: Feet supported  Static Sitting-Level of Assistance: Independent  Dynamic Sitting Balance  Dynamic Sitting-Balance Support: Feet supported  Dynamic Sitting-Level of Assistance: Close supervision    Static Standing Balance  Static Standing-Balance Support: Bilateral upper extremity supported  Static Standing-Level of Assistance: Close supervision  Static Standing-Comment/Number of Minutes: with RW  Dynamic Standing Balance  Dynamic Standing-Balance Support: Bilateral upper extremity supported  Dynamic Standing-Level of Assistance: Close supervision  Dynamic Standing-Comments: with RW  Functional Assessments:  Bed Mobility  Bed Mobility: Yes  Bed Mobility 1  Bed Mobility 1: Supine to sitting, Scooting  Level of Assistance 1: Minimum assistance  Bed Mobility Comments 1: assist for RLE to EOB.    Transfers  Transfer: Yes  Transfer 1  Technique 1: Sit to stand, Stand to sit  Transfer Device 1: Walker  Transfer Level of Assistance 1: Close  supervision  Trials/Comments 1: from bed x1 from commode x1 with cueing for hand placement.  no buckle or LOB. maintained 50% on RLE throughout activity.  Transfers 2  Technique 2: Sit to stand, Stand to sit  Transfer Device 2: Crutches  Transfer Level of Assistance 2: Close supervision  Trials/Comments 2: from chair to standing, no buckle or lOB, maintained 50% WB on RLE.    Ambulation/Gait Training  Ambulation/Gait Training Performed: Yes  Ambulation/Gait Training 1  Surface 1: Level tile  Device 1: Rolling walker  Assistance 1: Close supervision  Quality of Gait 1: Diminished heel strike, Decreased step length, Antalgic (non reciprocal stepping to maintain 50% WB restriction on RLE. maintained well. good use of UE support. no buckle or LOB)  Comments/Distance (ft) 1: 12 feetx1, 24 feetx1  Ambulation/Gait Training 2  Surface 2: Level tile  Device 2: Axillary crutches  Assistance 2: Contact guard  Quality of Gait 2: Antalgic, Decreased step length (non reciprocal stepping, maintained 50% WB on RLE, no buckle or LOB. tolerated well but increased unsteadiness at first)  Comments/Distance (ft) 2: 12 feetx2  Extremity/Trunk Assessments:  RUE   RUE : Within Functional Limits  LUE   LUE: Within Functional Limits  RLE   RLE : Exceptions to WFL (limited assessment of RLE strength due to surgical restrictions however atleast 3/5 noted with ROm at ankle, knee)  LLE   LLE : Within Functional Limits      Outcome Measures:  [facility] Basic Mobility  Turning from your back to your side while in a flat bed without using bedrails: A little  Moving from lying on your back to sitting on the side of a flat bed without using bedrails: A little  Moving to and from bed to chair (including a wheelchair): A little  Standing up from a chair using your arms (e.g. wheelchair or bedside chair): A little  To walk in hospital room: None  Climbing 3-5 steps with railing: A little  Basic Mobility - Total Score: 19    Encounter Problems        Encounter Problems (Active)       Balance       LTG - Patient will maintain standing and sitting balance to allow for completion of daily activities (Progressing)       Start:  05/13/25               Mobility       LTG - Patient will navigate 5 steps with railing and crutch at Dsx1  (Not Progressing)       Start:  05/13/25            STG - Patient will ambulate 100 feetx1 withcrutches at Dsx1 (Progressing)       Start:  05/13/25               PT Transfers       LTG - Patient will transfer from one surface to another with crutches at mod I (Progressing)       Start:  05/13/25            STG - Patient will perform bed mobility modified independently with leg  (Progressing)       Start:  05/13/25               Pain          Safety       LTG - Patient will demonstrate safety requirements appropriate to situation/environment (Progressing)       Start:  05/13/25                   Education Documentation  Precautions, taught by Darcy Barraza PT at 5/13/2025 12:05 PM.  Learner: Family, Patient  Readiness: Acceptance  Method: Explanation, Demonstration  Response: Verbalizes Understanding, Demonstrated Understanding    Body Mechanics, taught by Darcy Barraza PT at 5/13/2025 12:05 PM.  Learner: Family, Patient  Readiness: Acceptance  Method: Explanation, Demonstration  Response: Verbalizes Understanding, Demonstrated Understanding    Mobility Training, taught by Darcy Barraza PT at 5/13/2025 12:05 PM.  Learner: Family, Patient  Readiness: Acceptance  Method: Explanation, Demonstration  Response: Verbalizes Understanding, Demonstrated Understanding    Education Comments  Pt educated on precautions including maintaining 50% WB on surgical LOWER EXTREMITY, ambulating once each hour with appropriate device, and there are no hip ROM restrictions at this time. Focus for therapy is on functional mobility without structured exercises at this time until follow up with Dr. Fulton. Use of crutches is recommended, but  walker is okay to use for balance if needed. PT instructed on use of walker vs crutches.     Darcy Barraza, PT, DPT

## 2025-05-13 NOTE — NURSING NOTE
0330PM Pt ambulated to bathroom voided 400cc of yellow clear urine without difficulty. Pt repositioned back to recliner with call light in reach.

## 2025-05-13 NOTE — NURSING NOTE
1100am Pt's mcfarlane removed without difficulty, pt tolerated procedure well. Pt advised needs to void within 6-8 hours, with fluids encouraged. Pt up in recliner with call light in reach.

## 2025-05-13 NOTE — DISCHARGE INSTRUCTIONS
MD Flori Berrios [facility], [facility]  LENA/Hip Reconstruction Surgery  Phone: [phone]     Fax:[phone]              DISCHARGE INSTRUCTIONS      PLEASE READ CAREFULLY BEFORE CONTACTING YOUR PROVIDER.    [employer] IS THE PREFERRED COMMUNICATION FOR ALL TEAM MEMBERS.    POSTOPERATIVE INSTRUCTIONS: PERIACETABULAR OSTEOTOMY (LENA)    DYSPLASIA CARE TEAM  Please use the information below to contact your care team following surgery.  If you are leaving a message or using the [employer] Chart portal, please include your full name, date of birth and date of surgery so that we can correctly identify you.  Your call will be returned within 1-2 business days, please do not leave multiple messages with other staff regarding a single issue while you are awaiting a return call.     Who to call Contact Information Matters needing handled   Flori Herring PA-C  Physician Assistant [facility]   Prescription Refills   Medical questions/concerns       Ángela Lewis -               [phone]         Prescription Refills  Scheduling office Visits  Medical questions/concerns  Leave of Absence or other paperwork  Any concerns more than 6 weeks from surgery - an appointment will need to be made     MEDICATION REFILLS - Flori Herring PA-C ([facility]) or Ángela Lewis ([phone])    -You will NOT receive a call indicating that your prescription has been filled.  Please contact your pharmacy with any questions.    Medication refills will be filled Monday-Friday 7am to 1pm ONLY. Please call the office or send a [employer] message for a refill request.  Any requests received outside of this timeframe will be handled on the next business day.  Please do not call multiple times or call other members of the care team for medication needs, this will cause the refill to take longer.    Per State and Institutional policy, pain medications can only be refilled every 7 days for up to six weeks following  surgery.    My Chart Portal: If you are using the My Chart portal and are requesting a medication refill, please list what type of surgery you had and left or right side, medication that needs refilled, and pharmacy you would like your medication sent.     WEIGHT BEARING - 50% weight bearing taught by physical therapy    ACTIVITY - Limit your activities and rest until first post-operative follow up    DRIVING & TRAVEL AFTER SURGERY   Patients should anticipate waiting at least 4-6 weeks before traveling long distances after surgery, or unless discussed with Dr. Fulton.  You will need to stop to walk around ever 1 hour during your travel to help with blood clot prevention.    Patients may not drive until cleared by the MD/PA or the office and you are off of all narcotics.    WOUND CARE  You may shower with waterproof dressing on. Your surgical bandage will be removed by yourself/care giver 1 week after surgery. If you have sutures in place from the hip scope, these will be removed in the office at 2 week follow up. Once the dressing is removed, you may continue to shower. Let soap and water wash over the wound. DO NOT SCRUB.  Do not soak in a bath tub, hot tub, pool or lake until you are 8 weeks out from surgery.  Do not apply lotions, creams or ointments until you are 6 weeks out from surgery.    PAIN, SWELLING, BRUISING & CLICKING  Pain and swelling are a natural part of your recovery which is considered normal for up to a year after surgery.  Symptoms may be treated with movement, ice, compression stockings, elevating your leg, and by following the pain medication regimen as prescribed.  Bruising is normal for several weeks after surgery. You may ice areas that are tender to help with discomfort.  Pain and swelling may temporarily increase with an increase in activity or exercise.  Use ice after activity.  You may also feel decreased sensation or numbness near the incision site and the front/outer side of the  thigh.  This is normal and sensation may improve over time.    PERSONAL HYGIENE  You may shower upon discharging from the hospital.  Soap and water is permitted to run over the surgical dressing.  Do not scrub directly over these bandages.  DO NOT soak your incision in a bath, hot tub, pool or pond/lake for a minimum of 8 weeks following your surgery.  DO NOT use lotions, creams, ointments on your wound for a minimum of 6 weeks following your surgery. At that time you may use vitamin E to assist with softening of your incision.      RESTARTING HOME ROUTINE - DIET & MEDICATIONS  Post-operative constipation can result due to a combination of inactivity, anesthesia and pain medication. To help prevent this, you should increase your water and fiber intake. Physical activity such as walking will also help stimulate the bowels.   You may resume your normal diet when you discharge home.  Choose foods that help promote good bowel habits and prevent constipation, such as foods high in fiber.  You may restart your home medications the following day after your surgery UNLESS you have been given alternate instructions.  Follow the instructions given to you on your hospital discharge instructions for more information regarding your home medications.      OUTPATIENT PHYSICAL THERAPY  Outpatient physical therapy will start after you are seen in the office for 2 week post-operative check  You may choose any outpatient physical therapy location.      EMERGENCIES - WHEN TO CONTACT THE SURGEON'S OFFICE IMMEDIATELY  Fever >101 with chills that has been present for at least 48 hours.   Excessive bleeding from incision that will not slow down. A small amount of drainage is normal and expected.  Once pressure is applied and the area is covered, do not continue to check the area regularly.  This will remove pressure and bleeding will continue.  Leave in place for 4-6 hours.  Signs of infection of incision-excessive drainage that is soaking  through your dressing (especially if it is pus-like), redness that is spreading out from the edges of your incision, or increased warmth around the area.  Excruciating pain for which the pain medication, taken as instructed, is not helping.  Severe calf pain.  Go directly to the emergency room or call 911, if you are experiencing chest pain or difficulty breathing.    ICE & COLD THERAPY INSTRUCTIONS    To assist with pain control and post-op swelling, you should be using ice regularly throughout recovery, especially for the first 6 weeks, regardless of the cold therapy method you use.      Always make sure there is a layer of protection between the cold pad and your skin.    If you are using ICE PACKS or GEL PACKS, you will need to alternate 20 minutes on, 20 minutes off twice per hour.    If you are using an ICE MACHINE, please follow the provided ice machine instructions.  These devices differ from ice or ice packs whereas the mechanism circulates water through tubing and a pad to provide longer periods of cold therapy to the desired site.  You can use your cold devices around the clock for optimal comfort.  We recommend using cold therapy after working with therapy or completing exercises on your own.  There is no set schedule in which you must follow while using cold therapy.  Below are a few points to remember when using a cold therapy device:    You do not need to need to use the 20 on, 20 off method.  Detach the pad from the cooler and ambulate at least once every hour.  You can check your skin under the pad at this time.  You may wear the cold therapy device during periods of sleep including overnight.  If you wake up during the night, you can check the skin at this time.  You do not need to wake up specifically to perform skin checks.  Empty the cooler and pad when device is not in use.  Follow 's instructions for cleaning your cold therapy device.    DISCHARGE MEDICATIONS - Please reference the  sample schedule on the reverse side for instructions on how to best schedule medications.    PAIN MEDICATION    ___X_ Oxycodone  Oxycodone has been prescribed for post-operative pain control.    This medications will only be refilled ONCE every 7 days for a period of up to 6 weeks following surgery.  After 6 weeks, you will transition to acetaminophen and over -the- counter anti-inflammatories such as Ibuprofen, Advil or Aleve in conjunction with ICE/COLD THERAPY.   Side effects may be constipation and nausea, vomiting, sleepiness, dizziness, lightheadedness, headache, blurred vision, dry mouth sweating, itching (if you have itching, over-the -counter Benadryl can be used as needed).  You may NOT operate a motor vehicle while taking these medications or have been cleared by your care team.     ___X_ Acetaminophen (Tylenol)  Acetaminophen has been prescribed as an adjunct for pain control. Take two 500 mg tablets every 6 hours for 4 weeks. You will not receive a refill on this medication.  Do not exceed 4000mg of acetaminophen within a 24 hour period.  Side effects may include nausea, heartburn, drowsiness, and headache.    ___X_ Cyclobenzaprine (Flexeril)  This medications has been prescribed as an adjunct for pain control caused by muscle spasm/cramping  Side effects may be sleepiness, dizziness, lightheadedness, headache, blurred vision, etc. Do not take this medication at the same time as the Tramadol/Oxycodone as it may cause excessive sleepiness  You may NOT operate a motor vehicle while taking these medications or have been cleared by your care team.    BLOOD THINNER    ___X_ Aspirin  This blood thinner has been prescribed to prevent blood clots in your leg or lungs. Take as prescribed on the bottle for 4 weeks. You will not receive a refill on this medication.      ANTI NAUSEA    ___X_ Pantoprazole - Proton Pump Inhibitor (PPI) - Stomach Acid Reduction Medication  If you are already on a PPI, you will  continue your regular medication. If you are not, you will be prescribed Pantoprazole to help with nausea and protect your stomach while taking pain medication.  You will not receive a refill on this medication.  ___X_ Ondansetron (Zofran)  This medication has been prescribed to help with post-operative nausea and vomiting. It is normal to experience these symptoms for 1-2 weeks after surgery as your body recovers from the anesthesia and surgery.   Take as needed to help with the discomfort of nausea and to prevent vomiting.     STOOL SOFTENERS    ___X_ Senna Plus (Senna and Colace) and Miralax  Take medication to help with constipation while using the Oxycodone and Tramadol for pain control.  You will not receive a refill on this medication.    Continued Constipation  If you continue to be constipated despite daily use of Miralax and Senna Plus, you try an over-the-counter Dulcolax Suppository and use per instructions on the package.       NEUROPATHIC PAIN MEDICATIONS  ___X_ Gabapentin (Neurontin) or Pregabalin (Lyrica)  This medications has been prescribed for post-operative pain control caused from nerve pain   This medication should be taken three times a day until you are seen in the office for first post-operative check  Side effects may be nausea, vomiting, sleepiness, dizziness, lightheadedness, headache, blurred vision, dry mouth sweating, itching (if you have itching, over-the -counter Benadryl can be used as needed).  You may NOT operate a motor vehicle while taking these medications or have been cleared by your care team.    JOINT CAPSULE ADHESION/HETEROTOPIC OSSIFICATION PREVENTION  ___X_ Celebrex  This medications has been prescribe to prevent heterotopic ossification (the overgrowth of bone in unwanted places)  You will take this medication once a day until gone with no refills needed  ___X_ Losartan ****  This medications has been prescribed for the prevention of joint capsule adhesion formation  (excess scar tissue development)  This medication should be taken daily until gone  Side effects may include lightheadedness, blurred vision, or dizziness; if you experience any of these discontinue the use of this medication and call the office     SUPPLEMENTATION  ___X_ Cholecalciferol (Vitamin D)   This medications has been prescribe to help with bone healing  You will take this medication once a week until gone with no refills needed    You will not receive refills on the following medications.   Acetaminophen (Tylenol  Celebrex  Losartan  Miralax  Colace  Pantoprazole  Blood Thinner  Vitamin D    Pain Medication Refills -[phone] or MyChart- Monday through Friday 7am-1pm    FOLLOW-UP- You should have an appointment with a provider in 2 weeks.         SAMPLE              The times below are an example of how to organize medications to optimize pain control  Your actual medication schedule may vary based on your last dose taken IN THE HOSPITAL        Time 3:00 am 6:00 am 9:00 am 12:00 pm 3:00 pm 6:00 pm 9:00 pm 12:00 am   Medications Oxycodone Tylenol  Flexeril  Miralax   Blood Thinner  Colace  Pantoprazole or other PPI  Oxycodone  Meloxicam Tylenol  Flexeril   Oxycodone Tylenol  Miralax Blood Thinner  Colace  Oxycodone   Tylenol  Flexeril          You may begin to wean off the pain medication as your pain remains controlled with increased activity.  The schedules provided are meant to serve as an example.  You may wean off based on your pain control.  Please note that pain medications are not filled beyond 6 weeks after surgery.              The times below are an example of how to WEAN OFF medications WHILE CONTINUING TO OPTIMIZE PAIN CONTROL.  Your actual medication schedule may vary based on your last dose taken.      Time 12:00am 4:00am 8:00am 12:00pm 4:00pm 8:00pm   Med Tylenol Oxycodone   Tylenol Oxycodone Tylenol Oxycodone     Time 12:00am 6:00am 12:00pm 6:00pm   Med Tylenol Oxycodone   Tylenol  Oxycodone     Time 12:00am 8:00am 4:00pm   Med Tylenol Oxycodone   Tylenol     Time 12:00am 12:00pm   Med Tylenol Tylenol

## 2025-05-13 NOTE — CARE PLAN
Patient resting comfortably at time of exam. Pain is controlled. No nausea. Chart reviewed.  Vital signs are stable.  Nursing notes no issues.

## 2025-05-13 NOTE — PROGRESS NOTES
"Rosalva Castaneda is a 20 y.o. female on day 1 of admission presenting with Congenital hip dysplasia (HHS-HCC).    POD1 s/p right hip arthroscopy and derotational femoral osteotomy with Kim Mcneal and Kirstin.     Subjective   No acute events overnight. She reports her pain is controlled. Reports some tingling in the right foot. Denies chest pain and SOB. Vogel remains in place.        Objective     Physical Exam  Vitals and nursing note reviewed.   Constitutional:       Appearance: Normal appearance.   Cardiovascular:      Rate and Rhythm: Normal rate and regular rhythm.   Pulmonary:      Effort: Pulmonary effort is normal.   Abdominal:      General: Abdomen is flat.      Palpations: Abdomen is soft.   Neurological:      Mental Status: She is alert.     RLE: Dressing c/d/I. Shadowing at distal interlock dressing.   Thigh soft and compressible.   Intact ankle DF/PF/EHL/FHL/wiggles toes.   SILT in all distributions.   2+ DP, PT pulses.     Last Recorded Vitals  Blood pressure 111/64, pulse 60, temperature 36.8 °C (98.2 °F), temperature source Temporal, resp. rate 18, height 1.575 m (5' 2\"), weight 68.9 kg (152 lb), SpO2 100%.  Intake/Output last 3 Shifts:  I/O last 3 completed shifts:  In: 2108.3 (30.6 mL/kg) [I.V.:408.3 (5.9 mL/kg); IV Piggyback:1700]  Out: 4450 (64.5 mL/kg) [Urine:4300 (1.7 mL/kg/hr); Blood:150]  Weight: 68.9 kg     Relevant Results      Scheduled medications  Scheduled Medications[1]  Continuous medications  Continuous Medications[2]  PRN medications  PRN Medications[3]  Results for orders placed or performed during the hospital encounter of 05/12/25 (from the past 24 hours)   hCG, Urine, Qualitative   Result Value Ref Range    HCG, Urine NEGATIVE NEGATIVE   Type And Screen   Result Value Ref Range    ABO TYPE O     Rh TYPE POS     ANTIBODY SCREEN NEG    Hemoglobin and hematocrit, blood   Result Value Ref Range    Hemoglobin 12.6 12.0 - 16.0 g/dL    Hematocrit 36.8 36.0 - 46.0 %   CBC   Result " Value Ref Range    WBC 19.7 (H) 4.4 - 11.3 x10*3/uL    nRBC      RBC 3.74 (L) 4.00 - 5.20 x10*6/uL    Hemoglobin 11.1 (L) 12.0 - 16.0 g/dL    Hematocrit 33.0 (L) 36.0 - 46.0 %    MCV 88 80 - 100 fL    MCH 29.7 26.0 - 34.0 pg    MCHC 33.6 32.0 - 36.0 g/dL    RDW 11.9 11.5 - 14.5 %    Platelets 247 150 - 450 x10*3/uL   Basic Metabolic Panel   Result Value Ref Range    Glucose 119 (H) 74 - 99 mg/dL    Sodium 137 136 - 145 mmol/L    Potassium 4.2 3.5 - 5.3 mmol/L    Chloride 104 98 - 107 mmol/L    Bicarbonate 27 21 - 32 mmol/L    Anion Gap 10 10 - 20 mmol/L    Urea Nitrogen 9 6 - 23 mg/dL    Creatinine 0.72 0.50 - 1.05 mg/dL    eGFR >90 >60 mL/min/1.73m*2    Calcium 8.6 8.6 - 10.3 mg/dL               This patient has a urinary catheter   Reason for the urinary catheter remaining today? Urine catheter unnecessary, will be removed today               Assessment & Plan  Congenital hip dysplasia (HHS-HCC)    Chronic headaches    Tear of right acetabular labrum    Femoroacetabular impingement of right hip    Hemarthrosis of right hip    POD1 s/p right hip arthroscopy and derotational femoral osteotomy with Kim Mcneal and Kirstin.     Activity: PT/OT  Antibiotics: ancef x24 hours  Diet: regular  Dressing: change prior to DC  DVT Ppx: Aspirin 81mg BID  Vogel: remove when ambulatory.   Pain: multimodal  Weight Bearing: TTWB RLE  Dispo: pending clinical course. Likely POD2  Follow Up: in two weeks from surgery with LORIN      I spent 15 minutes in the professional and overall care of this patient.      Antonio Herbert MD           [1] acetaminophen, 1,000 mg, oral, q6h  amphetamine-dextroamphetamine, 30 mg, oral, Daily  aspirin, 81 mg, oral, BID  celecoxib, 200 mg, oral, Daily  cholecalciferol, 1.25 mg, oral, Daily  gabapentin, 300 mg, oral, TID  morphine, 2 mg, intravenous, q6h  orphenadrine, 100 mg, oral, BID  oxyCODONE, 5 mg, oral, q6h  pantoprazole, 40 mg, oral, Daily before breakfast  polyethylene glycol, 17 g, oral,  BID  sennosides, 2 tablet, oral, BID    [2] lactated Ringer's, 100 mL/hr, Last Rate: 100 mL/hr (05/13/25 0400)  oxygen, 2 L/min    [3] PRN medications: benzocaine-menthol, diphenhydrAMINE, docusate sodium, lubricating eye drops, melatonin, metoclopramide, ondansetron ODT **OR** ondansetron, sodium chloride

## 2025-05-13 NOTE — NURSING NOTE
Patient in bed resting. Reported pain 4/10; tolerable. Reminded patient that breakfast can be ordered at 7 am. No drainage noted at femur surgical site. Bed in low and locked position; call light within reach; bed alarm activated.

## 2025-05-13 NOTE — NURSING NOTE
Vitals:    05/13/25 0359   BP: 99/50   Pulse: 59   Resp: 14   Temp: 37.1 °C (98.8 °F)   SpO2: 99%     Patient in bed resting. Reported pain 6/10; tolerable. Vitals are stable, IV fluids infusing. Patient reported comfortable, not needing thing at this time. Patient has not sat up to the side of the bed due to bleeding from femur surgical sight. Surgeon aware from earlier shift of the concern. Bleeding has slowed; dressing intact; no signs of drainage at this time. Bed in low and locked position call light within reach; bed alarm activated. Will continue to monitor treatment progress.

## 2025-05-13 NOTE — CONSULTS
Consults    Reason For Consult  Medical management for history of ADHD and management of postop pain    History Of Present Illness  Rosalva Castaneda is a 20 y.o. female presenting with right LENA secondary to congenital hip dysplasia; patient has remained hemodynamically stable postoperatively.     Past Medical History  She has a past medical history of ADHD and Migraines.    Surgical History  She has a past surgical history that includes Dental surgery.     Social History  She reports that she has never smoked. She has quit using smokeless tobacco. She reports current alcohol use. She reports that she does not use drugs.    Family History  No family history on file.     Allergies  Patient has no known allergies.    Review of Systems  10 system review noncontributory  Physical Exam  Vital signs are stable  Patient is alert in no acute distress  Lungs are clear to auscultation and percussion  Cardiac is regular rate and rhythm normal S1-S2 without murmur gallop rub click S3 or S4  Abdomen is soft nontender positive bowel sounds there is no hepatosplenomegaly or CVA tenderness appreciated  Extremities without cyanosis clubbing erythema or edema  Operative site exam per Ortho, dressing has been changed no further bleeding noted extremities warm pulses are intact patient can dorsiflex plantarflex  Last Recorded Vitals  BP 98/55 (BP Location: Left arm, Patient Position: Lying)   Pulse 69   Temp 37.2 °C (99 °F) (Temporal)   Resp 18   Wt 68.9 kg (152 lb)   SpO2 100%     Relevant Results  Scheduled medications  acetaminophen, 1,000 mg, oral, q6h  amphetamine-dextroamphetamine, 30 mg, oral, Daily  aspirin, 81 mg, oral, BID  celecoxib, 200 mg, oral, Daily  gabapentin, 300 mg, oral, TID  morphine, 2 mg, intravenous, q6h  orphenadrine, 100 mg, oral, BID  oxyCODONE, 5 mg, oral, q6h  pantoprazole, 40 mg, oral, Daily before breakfast  polyethylene glycol, 17 g, oral, BID  sennosides, 2 tablet, oral, BID      Continuous  medications  oxygen, 2 L/min      PRN medications  PRN medications: benzocaine-menthol, diphenhydrAMINE, docusate sodium, lubricating eye drops, melatonin, metoclopramide, ondansetron ODT **OR** ondansetron, sodium chloride       Results for orders placed or performed during the hospital encounter of 05/12/25 (from the past 24 hours)   CBC   Result Value Ref Range    WBC 19.7 (H) 4.4 - 11.3 x10*3/uL    nRBC      RBC 3.74 (L) 4.00 - 5.20 x10*6/uL    Hemoglobin 11.1 (L) 12.0 - 16.0 g/dL    Hematocrit 33.0 (L) 36.0 - 46.0 %    MCV 88 80 - 100 fL    MCH 29.7 26.0 - 34.0 pg    MCHC 33.6 32.0 - 36.0 g/dL    RDW 11.9 11.5 - 14.5 %    Platelets 247 150 - 450 x10*3/uL   Basic Metabolic Panel   Result Value Ref Range    Glucose 119 (H) 74 - 99 mg/dL    Sodium 137 136 - 145 mmol/L    Potassium 4.2 3.5 - 5.3 mmol/L    Chloride 104 98 - 107 mmol/L    Bicarbonate 27 21 - 32 mmol/L    Anion Gap 10 10 - 20 mmol/L    Urea Nitrogen 9 6 - 23 mg/dL    Creatinine 0.72 0.50 - 1.05 mg/dL    eGFR >90 >60 mL/min/1.73m*2    Calcium 8.6 8.6 - 10.3 mg/dL       Assessment/Plan   Congenital hip dysplasia  Management per Ortho  PT  Pain control using multimodal narcotics/nonnarcotics and providing medication for breakthrough pain  Supportive care-Vogel removed today  ADHD  Continue Adderall   3.    DVT prophylaxis               SCDs               Aspirin protocol                         Mile Payan MD

## 2025-05-13 NOTE — CARE PLAN
The patient's goals for the shift include Pain Control    The clinical goals for the shift include Pain Control, maintain hemodnamic stability      Problem: Pain  Goal: Takes deep breaths with improved pain control throughout the shift  Outcome: Progressing  Goal: Turns in bed with improved pain control throughout the shift  Outcome: Progressing  Goal: Walks with improved pain control throughout the shift  Outcome: Progressing  Goal: Performs ADL's with improved pain control throughout shift  Outcome: Progressing  Goal: Participates in PT with improved pain control throughout the shift  Outcome: Progressing  Goal: Free from opioid side effects throughout the shift  Outcome: Progressing  Goal: Free from acute confusion related to pain meds throughout the shift  Outcome: Progressing     Problem: Fall/Injury  Goal: Not fall by end of shift  Outcome: Progressing  Goal: Be free from injury by end of the shift  Outcome: Progressing  Goal: Verbalize understanding of personal risk factors for fall in the hospital  Outcome: Progressing  Goal: Verbalize understanding of risk factor reduction measures to prevent injury from fall in the home  Outcome: Progressing  Goal: Use assistive devices by end of the shift  Outcome: Progressing  Goal: Pace activities to prevent fatigue by end of the shift  Outcome: Progressing

## 2025-05-13 NOTE — NURSING NOTE
1200pm Pt seen by Dr. Mcneal right lower leg assessed, dressing was previously changed still remains dry and intact. Pt has mom at bedside and call light in reach.

## 2025-05-13 NOTE — CARE PLAN
Problem: Mobility  Goal: LTG - Patient will navigate 5 steps with railing and crutch at Dsx1   Outcome: Not Progressing     Problem: Balance  Goal: LTG - Patient will maintain standing and sitting balance to allow for completion of daily activities  Outcome: Progressing     Problem: Mobility  Goal: STG - Patient will ambulate 100 feetx1 withcrutches at Dsx1  Outcome: Progressing     Problem: Safety  Goal: LTG - Patient will demonstrate safety requirements appropriate to situation/environment  Outcome: Progressing     Problem: PT Transfers  Goal: LTG - Patient will transfer from one surface to another with crutches at mod I  Outcome: Progressing  Goal: STG - Patient will perform bed mobility modified independently with leg   Outcome: Progressing     Problem: Pain  Goal: LTG - Patient will manage pain with the appropriate technique/Intervention  Outcome: Progressing     Problem: Mobility  Goal: LTG - Patient will navigate 5 steps with railing and crutch at Dsx1   Outcome: Not Progressing

## 2025-05-13 NOTE — NURSING NOTE
0745am Assumed care of pt, A/O x 4 c/o pains 4/10 to right leg no distress noted. Pt has right hip bulky dressing dry and intact and lateral leg mepliex dressing intact with shadow of old bleeding to dressing noted. Pt's mcfarlane intact draining yellow clear urine. Pt awake in bed with mom at bedside with call light in reach. Pt advised and made aware of plan for today to remove mcfarlane and get up OOB.

## 2025-05-14 VITALS
BODY MASS INDEX: 27.97 KG/M2 | RESPIRATION RATE: 16 BRPM | DIASTOLIC BLOOD PRESSURE: 58 MMHG | SYSTOLIC BLOOD PRESSURE: 121 MMHG | OXYGEN SATURATION: 100 % | TEMPERATURE: 98.4 F | HEIGHT: 62 IN | HEART RATE: 70 BPM | WEIGHT: 152 LBS

## 2025-05-14 PROBLEM — M25.051 HEMARTHROSIS OF RIGHT HIP: Status: RESOLVED | Noted: 2024-12-03 | Resolved: 2025-05-14

## 2025-05-14 PROBLEM — S73.191A TEAR OF RIGHT ACETABULAR LABRUM: Status: RESOLVED | Noted: 2024-12-03 | Resolved: 2025-05-14

## 2025-05-14 PROBLEM — G89.29 CHRONIC HEADACHES: Status: RESOLVED | Noted: 2025-05-12 | Resolved: 2025-05-14

## 2025-05-14 PROBLEM — R51.9 CHRONIC HEADACHES: Status: RESOLVED | Noted: 2025-05-12 | Resolved: 2025-05-14

## 2025-05-14 LAB
ANION GAP SERPL CALC-SCNC: 10 MMOL/L (ref 10–20)
BUN SERPL-MCNC: 13 MG/DL (ref 6–23)
CALCIUM SERPL-MCNC: 8.4 MG/DL (ref 8.6–10.3)
CHLORIDE SERPL-SCNC: 104 MMOL/L (ref 98–107)
CO2 SERPL-SCNC: 29 MMOL/L (ref 21–32)
CREAT SERPL-MCNC: 0.74 MG/DL (ref 0.5–1.05)
EGFRCR SERPLBLD CKD-EPI 2021: >90 ML/MIN/1.73M*2
ERYTHROCYTE [DISTWIDTH] IN BLOOD BY AUTOMATED COUNT: 12.3 % (ref 11.5–14.5)
GLUCOSE SERPL-MCNC: 90 MG/DL (ref 74–99)
HCT VFR BLD AUTO: 31.1 % (ref 36–46)
HGB BLD-MCNC: 10.1 G/DL (ref 12–16)
MCH RBC QN AUTO: 29.4 PG (ref 26–34)
MCHC RBC AUTO-ENTMCNC: 32.5 G/DL (ref 32–36)
MCV RBC AUTO: 90 FL (ref 80–100)
NRBC BLD-RTO: ABNORMAL /100{WBCS}
PLATELET # BLD AUTO: 205 X10*3/UL (ref 150–450)
POTASSIUM SERPL-SCNC: 4.1 MMOL/L (ref 3.5–5.3)
RBC # BLD AUTO: 3.44 X10*6/UL (ref 4–5.2)
SODIUM SERPL-SCNC: 139 MMOL/L (ref 136–145)
WBC # BLD AUTO: 10.4 X10*3/UL (ref 4.4–11.3)

## 2025-05-14 PROCEDURE — 2500000001 HC RX 250 WO HCPCS SELF ADMINISTERED DRUGS (ALT 637 FOR MEDICARE OP): Performed by: PHYSICIAN ASSISTANT

## 2025-05-14 PROCEDURE — 36415 COLL VENOUS BLD VENIPUNCTURE: CPT | Performed by: PHYSICIAN ASSISTANT

## 2025-05-14 PROCEDURE — 97116 GAIT TRAINING THERAPY: CPT | Mod: GP

## 2025-05-14 PROCEDURE — 97530 THERAPEUTIC ACTIVITIES: CPT | Mod: GP

## 2025-05-14 PROCEDURE — 2500000004 HC RX 250 GENERAL PHARMACY W/ HCPCS (ALT 636 FOR OP/ED): Performed by: PHYSICIAN ASSISTANT

## 2025-05-14 PROCEDURE — 2500000001 HC RX 250 WO HCPCS SELF ADMINISTERED DRUGS (ALT 637 FOR MEDICARE OP): Performed by: EMERGENCY MEDICINE

## 2025-05-14 PROCEDURE — 85027 COMPLETE CBC AUTOMATED: CPT | Performed by: PHYSICIAN ASSISTANT

## 2025-05-14 PROCEDURE — 82374 ASSAY BLOOD CARBON DIOXIDE: CPT | Performed by: PHYSICIAN ASSISTANT

## 2025-05-14 PROCEDURE — 2500000002 HC RX 250 W HCPCS SELF ADMINISTERED DRUGS (ALT 637 FOR MEDICARE OP, ALT 636 FOR OP/ED): Performed by: PHYSICIAN ASSISTANT

## 2025-05-14 PROCEDURE — 99221 1ST HOSP IP/OBS SF/LOW 40: CPT | Performed by: EMERGENCY MEDICINE

## 2025-05-14 RX ORDER — BISACODYL 10 MG/1
10 SUPPOSITORY RECTAL DAILY
Status: DISCONTINUED | OUTPATIENT
Start: 2025-05-14 | End: 2025-05-14

## 2025-05-14 RX ORDER — OXYCODONE HYDROCHLORIDE 5 MG/1
5 TABLET ORAL ONCE
Refills: 0 | Status: COMPLETED | OUTPATIENT
Start: 2025-05-14 | End: 2025-05-14

## 2025-05-14 RX ADMIN — OXYCODONE HYDROCHLORIDE 5 MG: 5 TABLET ORAL at 09:18

## 2025-05-14 RX ADMIN — OXYCODONE HYDROCHLORIDE 5 MG: 5 TABLET ORAL at 03:48

## 2025-05-14 RX ADMIN — ORPHENADRINE CITRATE 100 MG: 100 TABLET, EXTENDED RELEASE ORAL at 09:10

## 2025-05-14 RX ADMIN — CELECOXIB 200 MG: 200 CAPSULE ORAL at 09:18

## 2025-05-14 RX ADMIN — PANTOPRAZOLE SODIUM 40 MG: 40 TABLET, DELAYED RELEASE ORAL at 09:09

## 2025-05-14 RX ADMIN — ACETAMINOPHEN 1000 MG: 500 TABLET, FILM COATED ORAL at 03:48

## 2025-05-14 RX ADMIN — GABAPENTIN 300 MG: 300 CAPSULE ORAL at 09:18

## 2025-05-14 RX ADMIN — ONDANSETRON 4 MG: 2 INJECTION, SOLUTION INTRAMUSCULAR; INTRAVENOUS at 09:11

## 2025-05-14 RX ADMIN — OXYCODONE HYDROCHLORIDE 5 MG: 5 TABLET ORAL at 10:34

## 2025-05-14 RX ADMIN — SENNOSIDES 17.2 MG: 8.6 TABLET, FILM COATED ORAL at 09:18

## 2025-05-14 RX ADMIN — POLYETHYLENE GLYCOL 3350 17 G: 17 POWDER, FOR SOLUTION ORAL at 09:18

## 2025-05-14 RX ADMIN — ACETAMINOPHEN 1000 MG: 500 TABLET, FILM COATED ORAL at 09:10

## 2025-05-14 RX ADMIN — ASPIRIN 81 MG: 81 TABLET, COATED ORAL at 09:18

## 2025-05-14 RX ADMIN — MORPHINE SULFATE 2 MG: 2 INJECTION, SOLUTION INTRAMUSCULAR; INTRAVENOUS at 00:19

## 2025-05-14 ASSESSMENT — COGNITIVE AND FUNCTIONAL STATUS - GENERAL
DAILY ACTIVITIY SCORE: 21
CLIMB 3 TO 5 STEPS WITH RAILING: A LITTLE
DRESSING REGULAR LOWER BODY CLOTHING: A LITTLE
TURNING FROM BACK TO SIDE WHILE IN FLAT BAD: A LITTLE
MOVING FROM LYING ON BACK TO SITTING ON SIDE OF FLAT BED WITH BEDRAILS: A LITTLE
MOBILITY SCORE: 21
STANDING UP FROM CHAIR USING ARMS: A LITTLE
CLIMB 3 TO 5 STEPS WITH RAILING: A LITTLE
HELP NEEDED FOR BATHING: A LITTLE
TOILETING: A LITTLE
WALKING IN HOSPITAL ROOM: A LITTLE
MOVING TO AND FROM BED TO CHAIR: A LITTLE
TURNING FROM BACK TO SIDE WHILE IN FLAT BAD: A LITTLE
MOVING FROM LYING ON BACK TO SITTING ON SIDE OF FLAT BED WITH BEDRAILS: A LITTLE
MOBILITY SCORE: 18

## 2025-05-14 ASSESSMENT — PAIN DESCRIPTION - ORIENTATION
ORIENTATION: RIGHT
ORIENTATION: RIGHT

## 2025-05-14 ASSESSMENT — PAIN SCALES - GENERAL
PAINLEVEL_OUTOF10: 7
PAINLEVEL_OUTOF10: 4
PAINLEVEL_OUTOF10: 5 - MODERATE PAIN
PAINLEVEL_OUTOF10: 7
PAINLEVEL_OUTOF10: 5 - MODERATE PAIN
PAINLEVEL_OUTOF10: 6
PAINLEVEL_OUTOF10: 7

## 2025-05-14 ASSESSMENT — PAIN - FUNCTIONAL ASSESSMENT
PAIN_FUNCTIONAL_ASSESSMENT: 0-10

## 2025-05-14 ASSESSMENT — PAIN DESCRIPTION - DESCRIPTORS
DESCRIPTORS: DISCOMFORT
DESCRIPTORS: ACHING;SORE
DESCRIPTORS: SHARP
DESCRIPTORS: DISCOMFORT
DESCRIPTORS: DISCOMFORT

## 2025-05-14 ASSESSMENT — PAIN DESCRIPTION - LOCATION
LOCATION: HIP
LOCATION: HIP

## 2025-05-14 NOTE — DISCHARGE SUMMARY
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            Hospital Course   Patient underwent surgery for combined R hip scope/PFO without complications. Patient was then takent o the PACU in stabe condition. Patient was then transferred to the [facility].  Pain was appropriately controlled. Diet was advanced as tolerated. PT/OT was consulted and recommended home for patient on discharge. Patient had uneventful hospital course. Patient is to follow-up in 2 weeks at scheduled post-op visit.     Pertinent Physical Exam At Time of Discharge  Gen: The pt is A&Ox3, NAD, and appear state age and weight  Psychiatric: mood and affect are appropriate   Eyes: sclera are white, EOM grossly intact  ENT: MMM  Neck: supple, thyroid is midline  Respiratory: respirations are nonlabored, chest rise symmetric  CV: rate is regular by palpation of distal pulses  Abdomen: nondistended   Integument: no obvious cutaneous lesions noted. No signs of lymphangitis. No signs of systemic edema.   MSK: RLE surgical dressing c/d/I. SILT throughout the RLE intact. Foot warm and well perfused. Intact ankle plantar/dorsiflexion     Home Medications     Medication List      START taking these medications     acetaminophen 325 mg tablet; Commonly known as: Tylenol; Take 2 tablets   (650 mg) by mouth every 6 hours.   aspirin 81 mg EC tablet; Take 1 tablet (81 mg) by mouth 2 times a day.   celecoxib 100 mg capsule; Commonly known as: CeleBREX; Take 1 capsule   (100 mg) by mouth once daily for 10 days.   cyclobenzaprine 10 mg tablet; Commonly known as: Flexeril; Take 1 tablet   (10 mg) by mouth 3 times a day as needed for muscle spasms for up to 7   days.   ergocalciferol 1250 mcg (50,000 units) capsule; Commonly known as:   Vitamin D-2; Take 1 capsule (1.25 mg) by mouth 1 (one) time per week.;    Start taking on: May 18, 2025   gabapentin 300 mg capsule; Commonly known as: Neurontin; Take 1 capsule   (300 mg) by mouth 3 times a day.   ondansetron 4 mg tablet; Commonly known as: Zofran; Take 1 tablet (4 mg)   by mouth every 8 hours if needed for nausea or vomiting.   oxyCODONE 5 mg immediate release tablet; Commonly known as: Roxicodone;   Take 1 tablet (5 mg) by mouth every 4 hours if needed for severe pain (7 -   10) for up to 7 days.   pantoprazole 40 mg EC tablet; Commonly known as: ProtoNix; Take 1 tablet   (40 mg) by mouth once daily in the morning before meals. Do not crush,   chew, or split.   polyethylene glycol 17 gram packet; Commonly known as: Miralax; Take 17   g by mouth once daily for 10 days.   Stimulant Laxative Plus 8.6-50 mg tablet; Generic drug:   sennosides-docusate sodium; Take 1 tablet by mouth 2 times a day for 7   days.     CONTINUE taking these medications     Aimovig Autoinjector 140 mg/mL injection; Generic drug: erenumab   amphetamine-dextroamphetamine 20 mg tablet; Commonly known as: Adderall   ibuprofen 200 mg tablet     STOP taking these medications     chlorhexidine 0.12 % solution; Commonly known as: Peridex   SUMAtriptan 6 mg/0.5 mL injection; Commonly known as: Imitrex       Outpatient Follow-Up  Future Appointments   Date Time Provider Department [place]   5/28/2025 11:15 AM Flori Herring PA-C [id number] [facility]   5/29/2025 11:00 AM Vivian Jaime, PT [facility]   6/2/2025  1:30 PM Vivian Jaime, PT [facility]   6/4/2025  1:30 PM Vivian Jaime, PT [facility]   6/9/2025  1:30 PM Vivian Jaime, PT [facility]   6/11/2025  1:30 PM Vivian Jaime, PT [facility]   6/16/2025  1:30 PM Vivian Jaime, PT [facility]   6/18/2025  1:30 PM Vivian Jaime, PT [facility]   6/23/2025  1:30 PM Vivian Jaime, PT [facility]   6/25/2025  1:30 PM Vivian Jaime, PT [facility] Herring PA-C

## 2025-05-14 NOTE — CONSULTS
Consults    Reason For Consult  Medical management for history of ADHD and management of postop pain    History Of Present Illness  Rosalva Castaneda is a 20 y.o. female presenting with right LENA she has done well postoperatively without complications and is medically stable for discharge to home.     Past Medical History  She has a past medical history of ADHD and Migraines.    Surgical History  She has a past surgical history that includes Dental surgery.     Social History  She reports that she has never smoked. She has quit using smokeless tobacco. She reports current alcohol use. She reports that she does not use drugs.    Family History  No family history on file.     Allergies  Patient has no known allergies.    Review of Systems  10 system review was noncontributory  Physical Exam  Vital signs are stable  Patient is alert in no acute distress  Lungs are clear to auscultation and percussion  Cardiac is regular rate and rhythm normal S1-S2 without murmur gallop rub click S3 or S4  Abdomen is soft nontender positive bowel sounds there is no hepatosplenomegaly or CVA tenderness appreciated  Extremities without cyanosis clubbing erythema or edema  Operative site exam per Ortho extremity is warm pulses are intact patient can dorsiflex plantarflex  Last Recorded Vitals  /58 (BP Location: Left arm, Patient Position: Lying)   Pulse 70   Temp 36.9 °C (98.4 °F) (Temporal)   Resp 16   Wt 68.9 kg (152 lb)   SpO2 100%     Relevant Results  Scheduled medications  acetaminophen, 1,000 mg, oral, q6h  amphetamine-dextroamphetamine, 30 mg, oral, Daily  aspirin, 81 mg, oral, BID  celecoxib, 200 mg, oral, Daily  gabapentin, 300 mg, oral, TID  orphenadrine, 100 mg, oral, BID  oxyCODONE, 5 mg, oral, q6h  oxyCODONE, 5 mg, oral, Once  pantoprazole, 40 mg, oral, Daily before breakfast  polyethylene glycol, 17 g, oral, BID  sennosides, 2 tablet, oral, BID      Continuous medications  oxygen, 2 L/min      PRN medications  PRN  medications: benzocaine-menthol, diphenhydrAMINE, docusate sodium, lubricating eye drops, melatonin, metoclopramide, ondansetron ODT **OR** ondansetron, sodium chloride       Assessment/Plan   Congenital hip dysplasia  Management per Ortho  PT  Pain control using multimodal narcotics/nonnarcotics and providing medication for breakthrough pain  Supportive care  ADHD  Continue Adderall   3.    DVT prophylaxis               SCDs               Aspirin protocol                 Ambulate    Patient is medically stable for discharge to home                     Mile Payan MD

## 2025-05-14 NOTE — NURSING NOTE
Assumed care of pt. She's A&O x 4, currently sitting up in the chair. Her parents are at the bedside. She reports her pain rating at a 5/10 to the right hip. Scheduled pain medications will be administered shortly. Ice Man and ice packs are in place for comfort. Bulky surgical dressing is dry and intact. One of the silver mepilex dressings to the right hip is noted to have a small spot of old drainage. Assessment as charted, VSS. Incentive spirometry encouraged. Any questions/concerns were addressed. Call light is within reach, chair alarm activated.

## 2025-05-14 NOTE — CARE PLAN
The patient's goals for the shift include Pain Control    The clinical goals for the shift include pain control, safety

## 2025-05-14 NOTE — CARE PLAN
Problem: Pain  Goal: Takes deep breaths with improved pain control throughout the shift  Outcome: Progressing  Goal: Turns in bed with improved pain control throughout the shift  Outcome: Progressing  Goal: Walks with improved pain control throughout the shift  Outcome: Progressing  Goal: Performs ADL's with improved pain control throughout shift  Outcome: Progressing  Goal: Participates in PT with improved pain control throughout the shift  Outcome: Progressing  Goal: Free from opioid side effects throughout the shift  Outcome: Progressing  Goal: Free from acute confusion related to pain meds throughout the shift  Outcome: Progressing     Problem: Fall/Injury  Goal: Not fall by end of shift  Outcome: Progressing  Goal: Be free from injury by end of the shift  Outcome: Progressing  Goal: Verbalize understanding of personal risk factors for fall in the hospital  Outcome: Progressing  Goal: Verbalize understanding of risk factor reduction measures to prevent injury from fall in the home  Outcome: Progressing  Goal: Use assistive devices by end of the shift  Outcome: Progressing  Goal: Pace activities to prevent fatigue by end of the shift  Outcome: Progressing     Problem: Pain  Goal: LTG - Patient will manage pain with the appropriate technique/Intervention  Outcome: Progressing     Problem: Pain - Adult  Goal: Verbalizes/displays adequate comfort level or baseline comfort level  Outcome: Progressing     Problem: Safety - Adult  Goal: Free from fall injury  Outcome: Progressing     Problem: Discharge Planning  Goal: Discharge to home or other facility with appropriate resources  Outcome: Progressing     Problem: Chronic Conditions and Co-morbidities  Goal: Patient's chronic conditions and co-morbidity symptoms are monitored and maintained or improved  Outcome: Progressing     Problem: Nutrition  Goal: Nutrient intake appropriate for maintaining nutritional needs  Outcome: Progressing     Problem: Deep Vein  Thrombosis  Goal: I will remain free from complications of deep vein thrombosis and maintain current level of mobility  Outcome: Progressing   The patient's goals for the shift include Pain Control    The clinical goals for the shift include pain control, safety

## 2025-05-14 NOTE — NURSING NOTE
Patient will be discharged home with her Mom. Reviewed discharge medication list and instructions with both parties. They acknowledged understanding. Patient was escorted to family car via  by nursing staff.

## 2025-05-14 NOTE — NURSING NOTE
Patient was administered Zofran earlier for nausea, that has now subsided. Patient was cleared by PT, and cleared for discharge. Did experience some pain earlier that was not relieved with morning pain meds. One time order for 5mg oxycodone given with good pain relieve.

## 2025-05-14 NOTE — PROGRESS NOTES
Physical Therapy Treatment    Patient Name: Rosalva Castaneda  MRN: [id number]  Department: [facility]  Room: [address] [place] [address]  Today's Date: 5/14/2025  Time Calculation  Start Time: 1101  Stop Time: 1127  Time Calculation (min): 26 min         Assessment/Plan   PT Assessment  PT Assessment Results: Decreased range of motion, Decreased mobility, Orthopedic restrictions, Pain  Rehab Prognosis: Good  Barriers to Discharge Home: Physical needs  Physical Needs: Intermittent ADL assistance needed  Evaluation/Treatment Tolerance: Patient tolerated treatment well  Medical Staff Made Aware: Yes  End of Session Communication: Bedside nurse  Assessment Comment: Pt progressing towards goals with improvement in ambulation distance, mobility assist, strength, and completion of stairs. Pt able to completed transfers at mod I, ambulation at Dsx1 with crutches this session, stair navigation with supervision and crutch/railing, and bed mobility with assist for RLE. No buckle or Lob with tasks this session and no assist needed for ADLs. PT discussed precautions and 50% WB RLE restrictions until follow up with Dr. Fulton. Pt able to maintain RLE WB throughout session. PT recommends outpatient therapy with assistance as needed for continued improvement in mobility, strength, and pain management..     End of Session Patient Position: Up in chair (ice on R hip, call bell in reach, all need smet)     PT Plan  Treatment/Interventions: Bed mobility, Transfer training, Gait training, Stair training, Endurance training, Range of motion, Therapeutic activity, Positioning  PT Plan: Ongoing PT  PT Frequency: 5 times per week  PT Discharge Recommendations: Low intensity level of continued care  Equipment Recommended upon Discharge: Wheeled walker, Crutches  PT Recommended Transfer Status: Assistive device, Stand by assist  PT - OK to Discharge: Yes    PT Visit Info:  PT  Visit  PT Received On: 05/14/25  Response to Previous Treatment: Patient with no complaints  from previous session.    General Visit Information:   General  Reason for Referral: R acetabular reconstruction and derotational femur osteotomy  Referred By: Dr. Fulton  Family/Caregiver Present: Yes  Prior to Session Communication: Bedside nurse  Patient Position Received: Bed, 3 rail up  General Comment: cleared by RN and agreeable to session    Subjective   Precautions:  Precautions  LE Weight Bearing Status: Right Partial Weight Bearing  Medical Precautions: Fall precautions  Precautions Comment: 50% WB on RLE for 2 weeks and then transition to WBAT with crutches            Objective   Pain:  Pain Assessment  Pain Assessment: 0-10  0-10 (Numeric) Pain Score: 7  Pain Type: Surgical pain  Pain Location: Hip  Pain Orientation: Right  Pain Radiating Towards: thigh  Pain Descriptors: Discomfort  Pain Frequency: Intermittent  Pain Onset: Ongoing  Clinical Progression: Gradually improving  Pain Interventions: Cold applied, Rest, Repositioned, Elevated  Response to Interventions: Content/relaxed  Cognition:     Coordination:  Movements are Fluid and Coordinated: Yes  Postural Control:  Static Sitting Balance  Static Sitting-Balance Support: Feet supported  Static Sitting-Level of Assistance: Independent  Dynamic Sitting Balance  Dynamic Sitting-Balance Support: Feet supported  Dynamic Sitting-Level of Assistance: Independent  Static Standing Balance  Static Standing-Balance Support: Bilateral upper extremity supported  Static Standing-Level of Assistance: Distant supervision  Static Standing-Comment/Number of Minutes: with crutches  Dynamic Standing Balance  Dynamic Standing-Balance Support: Bilateral upper extremity supported  Dynamic Standing-Level of Assistance: Distant supervision  Dynamic Standing-Comments: with crutches  Extremity/Trunk Assessments:              Activity Tolerance:  Activity Tolerance  Endurance: Endurance does not limit participation in activity  Treatments:  Therapeutic Activity  Therapeutic  Activity Performed: Yes  Therapeutic Activity 1: pt toileted without assist for hygiene or hand hygiene. No buckle or lOB    Bed Mobility  Bed Mobility: Yes  Bed Mobility 1  Bed Mobility 1: Supine to sitting  Level of Assistance 1: Minimum assistance  Bed Mobility Comments 1: assist for RLE to EOB    Ambulation/Gait Training  Ambulation/Gait Training Performed: Yes  Ambulation/Gait Training 1  Surface 1: Level tile  Device 1: Axillary crutches  Assistance 1: Distant supervision  Quality of Gait 1: Antalgic, Decreased step length  Comments/Distance (ft) 1: 150 feetx2 - 12 feetx1, no buckle or ,initiating reciprocal stepping. tolerated well (maintained 50% WB RLE throughout ambulation)  Transfers  Transfer: Yes  Transfer 1  Technique 1: Sit to stand, Stand to sit  Transfer Device 1: Crutches  Transfer Level of Assistance 1: Modified independent  Trials/Comments 1: from bed x1, from commode x1, from chair x1 without buckle or LOB, tolerated well, maintained 50% WB on RLE    Stairs  Stairs: Yes  Stairs  Rails 1: Bilateral  Curb Step 1: No  Device 1: No device  Assistance 1: Close supervision  Comment/Number of Steps 1: 3 steps, non reciprocal stepping, cued to maintain WB RLE 50% and navigating UE support for assistance. tolerated well.  Stairs 2  Rails 2: Left  Curb Step 2: No  Device 2: Single crutch  Assistance 2: Close supervision  Comment/Number of Steps 2: 3 steps with cueing for stepping pattern and crutch navigation. no buckle or lOB, tolerated well with stepping pattern and WB status    Outcome Measures:  [facility] Basic Mobility  Turning from your back to your side while in a flat bed without using bedrails: A little  Moving from lying on your back to sitting on the side of a flat bed without using bedrails: A little  Moving to and from bed to chair (including a wheelchair): None  Standing up from a chair using your arms (e.g. wheelchair or bedside chair): None  To walk in hospital room: None  Climbing 3-5 steps  with railing: A little  Basic Mobility - Total Score: 21    Education Documentation  Pt educated on precautions including maintaining 50% WB on surgical LOWER EXTREMITY, ambulating once each hour with appropriate device, and there are no hip ROM restrictions at this time. Focus for therapy is on functional mobility without structured exercises at this time until follow up with Dr. Fulton. Use of crutches is recommended, but walker is okay to use for balance if needed. PT instructed on use of walker vs crutches.           OP EDUCATION:       Encounter Problems       Encounter Problems (Active)       Balance       LTG - Patient will maintain standing and sitting balance to allow for completion of daily activities (Progressing)       Start:  05/13/25               Mobility       LTG - Patient will navigate 5 steps with railing and crutch at Dsx1  (Progressing)       Start:  05/13/25            STG - Patient will ambulate 100 feetx1 withcrutches at Dsx1 (Progressing)       Start:  05/13/25               PT Transfers       LTG - Patient will transfer from one surface to another with crutches at mod I (Met)       Start:  05/13/25    Resolved:  05/14/25         STG - Patient will perform bed mobility modified independently with leg  (Progressing)       Start:  05/13/25               Pain          Pain - Adult          Safety       LTG - Patient will demonstrate safety requirements appropriate to situation/environment (Progressing)       Start:  05/13/25                 Darcy Barraza, PT, DPT

## 2025-05-14 NOTE — NURSING NOTE
Dressings changed to surgical incision sites X 2, the distal dressing that was bleeding has now stopped, dressing was changed to the distal site yesterday. Patient tolerated well. Pain meds given but patient continues to have escalating pain. No PRN's on board. Will reach out to hospitalist and get an order for a PRN. Mom at bedside, education given regarding the dressing changes and the waterproof bandages that will be needed for the suture sites. Both parties acknowledged understanding to all instructions.

## 2025-05-14 NOTE — CARE PLAN
Problem: Balance  Goal: LTG - Patient will maintain standing and sitting balance to allow for completion of daily activities  Outcome: Progressing     Problem: Mobility  Goal: LTG - Patient will navigate 5 steps with railing and crutch at Dsx1   Outcome: Progressing  Goal: STG - Patient will ambulate 100 feetx1 withcrutches at Dsx1  Outcome: Progressing     Problem: Safety  Goal: LTG - Patient will demonstrate safety requirements appropriate to situation/environment  Outcome: Progressing     Problem: PT Transfers  Goal: STG - Patient will perform bed mobility modified independently with leg   Outcome: Progressing     Problem: Pain  Goal: LTG - Patient will manage pain with the appropriate technique/Intervention  Outcome: Progressing     Problem: PT Transfers  Goal: LTG - Patient will transfer from one surface to another with crutches at mod I  Outcome: Met

## 2025-05-14 NOTE — NURSING NOTE
Assumed care of patient. Patient is awake in bed at this time. Ortho PA rounding on patient. Patients pain has been well managed with the pain meds ordered. Reviewed plan of care with patient and her mom, they both are in agreement to plan. Breakfast encouraged prior to 9a medication administration. Call light is within reach.

## 2025-05-14 NOTE — CARE PLAN
TCC met with patient at the bedside this morning during IDR to discuss care/discharge plan.  Plan is home when medically cleared.  Post op follow up on 5/28/25 at 11:15 am-[facility].  Outpatient PT eval on 5/29/25 at 11 am-[facility].  Family to transport and assist with care as needed.

## 2025-05-14 NOTE — PROGRESS NOTES
Medication Education     Medication education for Rosalva Castaneda was provided to the patient and family for the following medication(s):  Tylenol  Aspirin  Celebrex  Flexeril  Ergocalciferol  Gabapentin  Zofran  Oxycodone  Pantoprazole  Miralax  Senna-docusate    Medication education provided by a Pharmacist:  -Proper dose, indication, possible ADRs   -How the medication works and benefits of taking it  -Importance of compliance   -Potential duration of therapy    Identified potential barriers to education:  None    Method(s) of Education:  Verbal Written materials provided and reviewed    An opportunity to ask questions and receive answers was provided.     Assessment of understanding the patient and family:  2= meets goals/outcomes    Additional Notes (if applicable): Meds to beds delivered to patient.    Justine Solis, HennaD

## 2025-05-28 ENCOUNTER — HOSPITAL ENCOUNTER (OUTPATIENT)
Dept: RADIOLOGY | Facility: CLINIC | Age: 20
Discharge: HOME | End: 2025-05-28
Payer: COMMERCIAL

## 2025-05-28 ENCOUNTER — OFFICE VISIT (OUTPATIENT)
Dept: ORTHOPEDIC SURGERY | Facility: CLINIC | Age: 20
End: 2025-05-28
Payer: COMMERCIAL

## 2025-05-28 DIAGNOSIS — Q65.89 CONGENITAL HIP DYSPLASIA (HHS-HCC): ICD-10-CM

## 2025-05-28 DIAGNOSIS — M25.851 FEMOROACETABULAR IMPINGEMENT OF RIGHT HIP: ICD-10-CM

## 2025-05-28 DIAGNOSIS — M25.851 FEMOROACETABULAR IMPINGEMENT OF RIGHT HIP: Primary | ICD-10-CM

## 2025-05-28 PROCEDURE — 72170 X-RAY EXAM OF PELVIS: CPT | Performed by: RADIOLOGY

## 2025-05-28 PROCEDURE — 73552 X-RAY EXAM OF FEMUR 2/>: CPT | Mod: RT

## 2025-05-28 PROCEDURE — 73552 X-RAY EXAM OF FEMUR 2/>: CPT | Performed by: RADIOLOGY

## 2025-05-28 PROCEDURE — 99211 OFF/OP EST MAY X REQ PHY/QHP: CPT | Performed by: PHYSICIAN ASSISTANT

## 2025-05-28 PROCEDURE — 72190 X-RAY EXAM OF PELVIS: CPT

## 2025-05-28 RX ORDER — NORGESTIMATE AND ETHINYL ESTRADIOL 0.25-0.035
1 KIT ORAL
COMMUNITY
Start: 2025-02-23 | End: 2026-02-23

## 2025-05-28 RX ORDER — OXYCODONE HYDROCHLORIDE 5 MG/1
5 TABLET ORAL EVERY 6 HOURS PRN
Qty: 28 TABLET | Refills: 0 | Status: SHIPPED | OUTPATIENT
Start: 2025-05-28 | End: 2025-06-04

## 2025-05-28 RX ORDER — CYCLOBENZAPRINE HCL 10 MG
10 TABLET ORAL 3 TIMES DAILY PRN
Qty: 21 TABLET | Refills: 0 | Status: SHIPPED | OUTPATIENT
Start: 2025-05-28 | End: 2025-06-04

## 2025-05-28 RX ORDER — FERROUS SULFATE 325(65) MG
325 TABLET ORAL 2 TIMES DAILY
COMMUNITY
Start: 2025-02-23

## 2025-05-28 RX ORDER — CLASCOTERONE 1 G/100G
CREAM TOPICAL 2 TIMES DAILY
COMMUNITY
Start: 2025-03-24

## 2025-05-28 ASSESSMENT — PAIN - FUNCTIONAL ASSESSMENT: PAIN_FUNCTIONAL_ASSESSMENT: NO/DENIES PAIN

## 2025-05-29 ENCOUNTER — EVALUATION (OUTPATIENT)
Dept: PHYSICAL THERAPY | Facility: HOSPITAL | Age: 20
End: 2025-05-29
Payer: COMMERCIAL

## 2025-05-29 ENCOUNTER — APPOINTMENT (OUTPATIENT)
Dept: PHYSICAL THERAPY | Facility: CLINIC | Age: 20
End: 2025-05-29
Payer: COMMERCIAL

## 2025-05-29 DIAGNOSIS — Z47.89 ORTHOPEDIC AFTERCARE: ICD-10-CM

## 2025-05-29 DIAGNOSIS — M25.851 FEMOROACETABULAR IMPINGEMENT OF RIGHT HIP: ICD-10-CM

## 2025-05-29 DIAGNOSIS — Q65.89 OTHER SPECIFIED CONGENITAL DEFORMITIES OF HIP: ICD-10-CM

## 2025-05-29 DIAGNOSIS — M25.551 RIGHT HIP PAIN: Primary | ICD-10-CM

## 2025-05-29 DIAGNOSIS — Q65.89 FEMORAL ANTEVERSION OF RIGHT LOWER EXTREMITY: ICD-10-CM

## 2025-05-29 DIAGNOSIS — S73.191A ACETABULAR LABRUM TEAR, RIGHT, INITIAL ENCOUNTER: ICD-10-CM

## 2025-05-29 PROCEDURE — 97161 PT EVAL LOW COMPLEX 20 MIN: CPT | Mod: GP

## 2025-05-29 PROCEDURE — 97140 MANUAL THERAPY 1/> REGIONS: CPT | Mod: GP

## 2025-05-29 PROCEDURE — 97110 THERAPEUTIC EXERCISES: CPT | Mod: GP

## 2025-05-29 ASSESSMENT — ENCOUNTER SYMPTOMS
DEPRESSION: 0
LOSS OF SENSATION IN FEET: 0
OCCASIONAL FEELINGS OF UNSTEADINESS: 0

## 2025-05-29 ASSESSMENT — PAIN SCALES - GENERAL: PAINLEVEL_OUTOF10: 6

## 2025-05-29 ASSESSMENT — PAIN - FUNCTIONAL ASSESSMENT: PAIN_FUNCTIONAL_ASSESSMENT: 0-10

## 2025-05-29 NOTE — PROGRESS NOTES
Physical Therapy  Physical Therapy Orthopedic Evaluation    Patient Name: Rosalva Castaneda  MRN: [id number]  Today's Date: 5/29/2025  Time Calculation  Start Time: 1100  Stop Time: 1210  Time Calculation (min): 70 min    Insurance:  Visit number: 1 of 30   Authorization info: No auth needed (soft limit)   Insurance Type: Medical [employer] [facility] [place]     General:  Reason for visit: s/p right hip arthroscopy with rim trim subspinous decompression, acetabular labral repair, 3-anchor looped configuration for a tear extending from the 12 to 3 o'clock position, Intra-articular loose body removal, Femoral osteochondroplasty for CAM lesion, Capsular plication by Dr. Mcneal and   femoral derotation with ronan placement by Dr. Fulton on 5/12   Referred by: Dr. Fulton/Flori Chavira PA-C  School: [employer]   Sport: basketball     Current Problem  1. Right hip pain  Follow Up In Physical Therapy      2. Femoroacetabular impingement of right hip  Referral to Physical Therapy    Follow Up In Physical Therapy      3. Femoral anteversion of right lower extremity  Referral to Physical Therapy    Follow Up In Physical Therapy      4. Acetabular labrum tear, right, initial encounter  Referral to Physical Therapy      5. Other specified congenital deformities of hip  Referral to Physical Therapy      6. Orthopedic aftercare  Follow Up In Physical Therapy          Precautions: WBAT, follow standard hip labral repair.Hx of migraines   Precautions  STEADI Fall Risk Score (The score of 4 or more indicates an increased risk of falling): 1    Medical History Form: Reviewed (scanned into chart)    Subjective:     Chief Complaint: 20 year old female presents 2 weeks s/p R hip scope labral repair, femoral derotation with ronan placement on 5/12. Pt spent 2 days in the hospital, no complications following surgery. Patient states her pain has been relatively manageable, taking meds as prescribed. Ice machine at home she has been using consistently. She had MD  follow up yesterday where they Oked WBAT and can wean from crutches when able. Pt was not given a brace. She has not done any ROM or exercise since surgery.     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 Plays "[employer] basketball club. First injured her hip 3 years ago, progressively worse over the past year.   Initial Injury Date: Chronic  Surgery Date: 5/12/25  COLLIN: Basketball    Current Condition:   Worse due to recent surgery     Pain:  Pain Assessment: 0-10  0-10 (Numeric) Pain Score: 6  Location: R lateral hip and thigh   Description: tight, sore   Current pain: 6/10   Worst pain: 10/10   Meds: oxycodone, aspirin   Aggravating Factors: limited in all Adls due to recent surgery, unable to drive   Relieving Factors:  Rest, Ice, and Lying    Relevant Information (PMH & Previous Tests/Imaging): Migraines  Previous Interventions/Treatments: Physical Therapy    Prior Level of Function (PLOF)  Patient previously independent with all ADLs  Exercise/Physical Activity: get back to playing basketball, driving, running, ADLs without pain   Work/School: student (rising senior at [employer])     Patients Living Environment: Reviewed and no concern    Primary Language: English    There are no spiritual/cultural practices/values/needs that are important to know    Patient's Goal(s) for Therapy: get back to playing basketball and normal life without pain    Red Flags: Do you have any of the following? No  Fever/chills, unexplained weight changes, dizziness/fainting, unexplained change in bowel or bladder functions, unexplained malaise or muscle weakness, night pain/sweats, numbness or tingling  Signs of DVT including: Pain, swelling or tenderness to calf, warm or red skin, previous history of DVT    Objective:  Patient presents to clinic ambulating with bilateral axillary crutches     Surgical sight inspected: No signs of infection; Stitches intact and wound healing well. Steri strips donned  Negative wells criteria       ROM      Hip  "PROM (Degrees)      (R)  (L)  Flexion: 60  90 deg before pulling on R hip    Extension: Neutral   Neutral   Abduction:    15   44  Adduction: NT  NT  ER:  NT  35  IR:  Past neutral  72    Hip AROM (Degrees)  *Will be assessed at future visit secondary to post op precautions        STRENGTH TESTING  *No strength testing performed secondary to patient being post-op. Will be assessed at follow up visit.       Posture: weight shift L       Palpation: + TTP with hypertonicity to R quad,     Difficulty vera quad with quivering noted          Outcome Measures:  Other Measures  Lower Extremity Funtional Score (LEFS): 1     EDUCATION: home exercise program, plan of care, activity modifications, pain management, and injury pathology         Goals: Set and discussed today  Active       PT Problem       PT Goal 1       Start:  05/29/25    Expected End:  10/16/25       Patient will report <4/10 pain in R hip by 6 weeks , 2/10 by 10 weeks  PROM R hip flexion 90 deg by 3-4 weeks, abduction 20 deg by 3 weeks, 40 deg by 6 weeks, ER 20 deg by 3 weeks, and IR 30 deg by 3 weeks, full hip ROM by 6-8 weeks to demonstrate improved joint mechanics to perform ADLs   LTG 16 weeks R hip strength: flexion, abduction, ER/IR, extension, glute max  >4+ to 5/5 MMT or 80% to demonstrate pelvic stability during ADLs and higher level functional tasks   Patient able to perform SL squat without valgus or anterior hip impingement to demonstrate LE biomechanics by 6- 8 weeks   LEFS > 30/80 to demonstrate improved ability to perform ADLs by 6 weeks   Patient will be independent with HEP by 1 week               Plan of care was developed with input and agreement by the patient      Treatment Performed:      Therapeutic Exercise:    15 min  Bike 6 minutes for ROM rocking  Supine lying pillow 3 minutes   Isometrics- glutes, quad, ab  Hip add iso 2 x 10   SAQ over ball 2 x 10 5\" hold   Quad rock back 15 x     Manual Therapy:    30 min  STM to R quad, " adductors, hip flexors, gluteals, ITB   PROM R hip flexion, abd  Log rolling     PT eval 20 minutes     PT Evaluation Time Entry  PT Evaluation (Low) Time Entry: 20  PT Therapeutic Procedures Time Entry  Manual Therapy Time Entry: 30  Therapeutic Exercise Time Entry: 15                      Assessment: 20 year old female presents to clinic s/p right hip arthroscopy with rim trim subspinous decompression, acetabular labral repair, 3-anchor looped configuration for a tear extending from the 12 to 3 o'clock position, Intra-articular loose body removal, Femoral osteochondroplasty for CAM lesion, Capsular plication by Dr. Mcneal and   femoral derotation with ronan placement by Dr. Fulton on 5/12. Clinical examination reveals pain, edema, and impaired mobility; strength, balance, and functional mobility to be assessed at future visit secondary to post op status. Patient is currently limited in all functional mobility; unable to ambulate, negotiate stairs, squat, kneel or participate in previous active lifestyle secondary to surgery. Patient will benefit from skilled PT intervention to return to all ADLs, community ambulation and recreational activities symptom free.       Clinical Presentation: Stable and/or uncomplicated characteristics    Plan:     Therapy plan of care will include the following interventions: aquatic therapy, gait training, dry needling, therapeutic exercise, therapeutic activities, education/instruction, home program, electrical stimulation, manual therapy, mechanical traction, neuromuscular re-education, biofeedback, kinesiotape, cryotherapy, vasopneumatic device with cold, edema control, self care/home management, blood flow restriction (BFR)    Frequency: 2 x Week  Duration: 6 Months  Rehab Potential/Prognosis: Excellent      Vivian Jaime, PT

## 2025-05-29 NOTE — PROGRESS NOTES
History of Present Illness   Rosalva Castaneda is a 20 y.o. female presenting today for follow up visit from combination right hip scope and PFO on 5/12/2025. Overall doing ok. Has mild pain that is requiring narcotics for pain control. Rates pain a 6/10. Incisions are well healing without redness or drainage. Compliant with WB recommendations. Denies numbness, tingling, f/c, CP, SOB or any other complaints or concerns.      Medical History[1]    Medication Documentation Review Audit       Reviewed by Adriana Nelson LPN (Licensed Nurse) on 05/28/25 at 1152      Medication Order Taking? Sig Documenting Provider Last Dose Status   acetaminophen (Tylenol) 325 mg tablet [id number] Yes Take 2 tablets (650 mg) by mouth every 6 hours. Flori Herring PA-C  Active   Aimovig Autoinjector 140 mg/mL injection [id number] Yes Inject 1 mL (140 mg) under the skin every 28 (twenty-eight) days. Historical Provider, MD  Active     Discontinued 05/28/25 1151   aspirin 81 mg EC tablet [id number] Yes Take 1 tablet (81 mg) by mouth 2 times a day. Flori Herring PA-C  Active     Discontinued 05/28/25 1151     Discontinued 05/28/25 1151   ergocalciferol (Vitamin D-2) 1250 mcg (50,000 units) capsule [id number] Yes Take 1 capsule (1.25 mg) by mouth 1 (one) time per week. Flori Herring PA-C  Active   ferrous sulfate 325 mg (65 mg elemental) tablet [id number] Yes Take 1 tablet by mouth twice a day. Historical Provider, MD  Active   gabapentin (Neurontin) 300 mg capsule [id number] Yes Take 1 capsule (300 mg) by mouth 3 times a day. Flori Herring PA-C  Active   ibuprofen 200 mg tablet [id number] Yes Take 3 tablets (600 mg) by mouth once daily as needed for mild pain (1 - 3). Historical Provider, MD  Active   norgestimate-ethinyl estradiol (Ortho-Cyclen) 0.25-0.035 mg tablet [id number] Yes Take 1 tablet by mouth once daily. Molly Provider, MD  Active   ondansetron (Zofran) 4 mg tablet [id number] Yes Take 1 tablet (4 mg) by mouth every 8 hours if  needed for nausea or vomiting. Flori Herring PA-C  Active   pantoprazole (ProtoNix) 40 mg EC tablet [id number] Yes Take 1 tablet (40 mg) by mouth once daily in the morning before meals. Do not crush, chew, or split. Flori Herring PA-C  Active   polyethylene glycol (Miralax) 17 gram packet [id number]  Take 17 g by mouth once daily for 10 days. Flori Herring PA-C  Active   sennosides-docusate sodium (Senna Plus) 8.6-50 mg tablet [id number] Yes Take 1 tablet by mouth 2 times a day for 7 days. Flori Herring PA-C  Active   Winlevi 1 % cream [id number] Yes Apply topically 2 times a day. to affected area Historical Provider, MD  Active                    RX Allergies[2]    Social History     Socioeconomic History    Marital status: Single     Spouse name: Not on file    Number of children: Not on file    Years of education: Not on file    Highest education level: Not on file   Occupational History    Not on file   Tobacco Use    Smoking status: Never    Smokeless tobacco: Former   Substance and Sexual Activity    Alcohol use: Yes     Comment: occasional    Drug use: Never    Sexual activity: Not on file   Other Topics Concern    Not on file   Social History Narrative    Not on file     Social Drivers of Health     Financial Resource Strain: Low Risk  (5/12/2025)    Overall Financial Resource Strain (CARDIA)     Difficulty of Paying Living Expenses: Not hard at all   Food Insecurity: No Food Insecurity (5/12/2025)    Hunger Vital Sign     Worried About Running Out of Food in the Last Year: Never true     Ran Out of Food in the Last Year: Never true   Transportation Needs: No Transportation Needs (5/12/2025)    PRAPARE - Transportation     Lack of Transportation (Medical): No     Lack of Transportation (Non-Medical): No   Physical Activity: Sufficiently Active (5/8/2024)    Received from [employer]    Exercise Vital Sign     Days of Exercise per Week: 4 days     Minutes of Exercise per Session: 150+ min   Stress: Stress  Concern Present (5/8/2024)    Received from [employer]    [facility] [employer] - Occupational Stress Questionnaire     Feeling of Stress : Rather much   Social Connections: Moderately Isolated (5/8/2024)    Received from [employer]    Social Connection and Isolation Panel [NHANES]     Frequency of Communication with Friends and Family: More than three times a week     Frequency of Social Gatherings with Friends and Family: More than three times a week     Attends [employer] Services: Never     Active Member of Clubs or Organizations: Yes     Attends Club or Organization Meetings: More than 4 times per year     Marital Status: Never    Intimate Partner Violence: Not At Risk (5/12/2025)    Humiliation, Afraid, Rape, and Kick questionnaire     Fear of Current or Ex-Partner: No     Emotionally Abused: No     Physically Abused: No     Sexually Abused: No   Housing Stability: Low Risk  (5/12/2025)    Housing Stability Vital Sign     Unable to Pay for Housing in the Last Year: No     Number of Times Moved in the Last Year: 0     Homeless in the Last Year: No       Surgical History[3]       Review of Systems:  ROS reviewed and negative other than as listed in the HPI     Physical Exam:  Gen: The pt is A&Ox3, NAD, and appear state age and weight  Psychiatric: mood and affect are appropriate   Eyes: sclera are white, EOM grossly intact  ENT: MMM  Neck: supple, thyroid is midline  Respiratory: respirations are nonlabored, chest rise symmetric  CV: rate is regular by palpation of distal pulses  Abdomen: nondistended   Integument: no obvious cutaneous lesions noted. No signs of lymphangitis. No signs of systemic edema.   MSK: Right hip surgical incisions well healing without edema, erythema, drainage, or other s/sx of infection. Appropriately tender to palpation around the incision. SILT throughout the leg intact. Intact plantarflexion and dorsiflexion. Foot warm and well perfused.      Imaging:  I  personally reviewed multiple views of the pelvis and left femur were obtained in the office today demonstrate maintenance of reduction, interval healing, and a stable position of the hardware.      Assessment   20 y.o. female post-op from right hip scope with PFO on 5/12/2025.     Plan:  She can advance to weightbearing as tolerated at this time.  I provided her prescription for physical therapy.  She has no range of motion restrictions.    Her incisions are well-healing.  Sutures were removed and Steri-Strips placed with wound care instructed    Continue DVT ppx and vit d/calcium for bone health  Pt requesting refill of narcotics. OARSS was reviewed and not concerning for signs of abuse thus in setting of acute post-operative period patient provided a refill after counseling on risk of addiction.        Follow-up 1 month with 2 views right femur and weightbearing AP pelvis.     All of the patient's questions/concerns address and they are in agreement with the plan.                    [1]   Past Medical History:  Diagnosis Date    ADHD     Migraines    [2] No Known Allergies  [3]   Past Surgical History:  Procedure Laterality Date    DENTAL SURGERY

## 2025-06-02 ENCOUNTER — TREATMENT (OUTPATIENT)
Dept: PHYSICAL THERAPY | Facility: HOSPITAL | Age: 20
End: 2025-06-02
Payer: COMMERCIAL

## 2025-06-02 DIAGNOSIS — M25.851 FEMOROACETABULAR IMPINGEMENT OF RIGHT HIP: Primary | ICD-10-CM

## 2025-06-02 DIAGNOSIS — Z47.89 ORTHOPEDIC AFTERCARE: ICD-10-CM

## 2025-06-02 DIAGNOSIS — Q65.89 FEMORAL ANTEVERSION OF RIGHT LOWER EXTREMITY: ICD-10-CM

## 2025-06-02 DIAGNOSIS — M25.551 RIGHT HIP PAIN: ICD-10-CM

## 2025-06-02 PROCEDURE — 97110 THERAPEUTIC EXERCISES: CPT | Mod: GP

## 2025-06-02 PROCEDURE — 97140 MANUAL THERAPY 1/> REGIONS: CPT | Mod: GP

## 2025-06-02 NOTE — PROGRESS NOTES
"  Physical Therapy  Physical Therapy Treatment Note    Patient Name: Rosalva Castaneda  MRN: [id number]  Today's Date: 6/2/2025  Time Calculation  Start Time: 1330  Stop Time: 1430  Time Calculation (min): 60 min    Insurance:  Visit number: 2 of 30  Authorization info: No auth needed (soft limit   Insurance Type: Medical [facility]    General:  Reason for visit:  s/p right hip arthroscopy with rim trim subspinous decompression, acetabular labral repair, 3-anchor looped configuration for a tear extending from the 12 to 3 o'clock position, Intra-articular loose body removal, Femoral osteochondroplasty for CAM lesion, Capsular plication by Dr. Mcneal and   femoral derotation with ronan placement by Dr. Fulton on 5/12   Referred by: Dr. Fulton/Flori Chavira PA-C   School: [employer]  Sport: basketball     Current Problem  1. Femoroacetabular impingement of right hip        2. Femoral anteversion of right lower extremity        3. Orthopedic aftercare        4. Right hip pain            Precautions: WBAT, follow standard hip labral repair.Hx of migraines   Precautions  STEADI Fall Risk Score (The score of 4 or more indicates an increased risk of falling): 1      Subjective:     Patient reports her hip is feeling tight overall. Had some soreness after initial session but nothing too bad.     Pain   6/10     Performing HEP?: Yes      Objective:   R hip PROM flexion to 80 deg   IR to 40       Treatment Performed:      Therapeutic Exercise:    30 min  Bike 10 minutes for ROM rocking  Supine lying pillow 3 minutes   Isometrics- glutes, quad, ab  Hip add iso 2 x 10   Hip abd iso 2 x 10 5\" hold   SAQ over ball 2 x 10 5\" hold   Quad rock back 15 x 2  BKFO 2 x 10   Bridges 3 x 8     Manual Therapy:    30 min  STM to R quad, adductors, hip flexors, gluteals, ITB   PROM R hip flexion, abd, ER   Log rolling   Scar massage       Other:     0 min  Declined ice to home       PT Therapeutic Procedures Time Entry  Manual Therapy Time Entry: " 30  Therapeutic Exercise Time Entry: 30                      Assessment:   The focus of today's session was flexibility/ROM  and education. Patient appropriately challenged by therapeutic exercise and was able to complete with fatigue at end of session. The patient is still limited in overall strength, flexibility, motor control and pain  and range of motion at this time. Patient progressing well overall with therapy and continues to require skilled care to address motor control, strength, flexibility and functional deficits. Gait training performed to encourage hip extension and neutral foot posture. Pt to continue on two crutches for community ambulation due gait deviations.         Plan:  Continue to progress per protocol.       Vivian Jaime, PT

## 2025-06-03 ENCOUNTER — APPOINTMENT (OUTPATIENT)
Dept: PHYSICAL THERAPY | Facility: CLINIC | Age: 20
End: 2025-06-03
Payer: COMMERCIAL

## 2025-06-04 ENCOUNTER — TREATMENT (OUTPATIENT)
Dept: PHYSICAL THERAPY | Facility: HOSPITAL | Age: 20
End: 2025-06-04
Payer: COMMERCIAL

## 2025-06-04 DIAGNOSIS — Q65.89 FEMORAL ANTEVERSION OF RIGHT LOWER EXTREMITY: ICD-10-CM

## 2025-06-04 DIAGNOSIS — S73.191A ACETABULAR LABRUM TEAR, RIGHT, INITIAL ENCOUNTER: ICD-10-CM

## 2025-06-04 DIAGNOSIS — Z47.89 ORTHOPEDIC AFTERCARE: ICD-10-CM

## 2025-06-04 DIAGNOSIS — M25.551 RIGHT HIP PAIN: ICD-10-CM

## 2025-06-04 DIAGNOSIS — M25.851 FEMOROACETABULAR IMPINGEMENT OF RIGHT HIP: Primary | ICD-10-CM

## 2025-06-04 PROCEDURE — 97140 MANUAL THERAPY 1/> REGIONS: CPT | Mod: GP

## 2025-06-04 PROCEDURE — 97110 THERAPEUTIC EXERCISES: CPT | Mod: GP

## 2025-06-04 ASSESSMENT — PAIN - FUNCTIONAL ASSESSMENT: PAIN_FUNCTIONAL_ASSESSMENT: 0-10

## 2025-06-04 ASSESSMENT — PAIN SCALES - GENERAL: PAINLEVEL_OUTOF10: 6

## 2025-06-04 NOTE — PROGRESS NOTES
"  Physical Therapy  Physical Therapy Treatment Note    Patient Name: Rosalva Castaneda  MRN: [id number]  Today's Date: 6/4/2025  Time Calculation  Start Time: 1330  Stop Time: 1435  Time Calculation (min): 65 min    Insurance:  Visit number: 3 of 30  Authorization info: No auth needed (soft limit   Insurance Type: Medical [facility]    General:  Reason for visit:  s/p right hip arthroscopy with rim trim subspinous decompression, acetabular labral repair, 3-anchor looped configuration for a tear extending from the 12 to 3 o'clock position, Intra-articular loose body removal, Femoral osteochondroplasty for CAM lesion, Capsular plication by Dr. Mcneal and   femoral derotation with ronan placement by Dr. Fulton on 5/12   Referred by: Dr. Fulton/Flori Chavira PA-C   School: [employer]  Sport: basketball     Current Problem  1. Femoroacetabular impingement of right hip        2. Femoral anteversion of right lower extremity        3. Orthopedic aftercare        4. Right hip pain        5. Acetabular labrum tear, right, initial encounter              Precautions: WBAT, follow standard hip labral repair.Hx of migraines   Precautions  STEADI Fall Risk Score (The score of 4 or more indicates an increased risk of falling): 1      Subjective:     Patient reports her hip is sore today, has been sore since Mondays session. Anterior hip and near her knee. She stopped taking her narcotics a few days ago as well which may be contributing to increased pain.     Pain  Pain Assessment: 0-10  0-10 (Numeric) Pain Score: 66/10     Performing HEP?: Yes      Objective:   R hip PROM flexion to 88 deg   IR to 40     + hypertonicity to R quad, ITB, hamstrings, hip flexors, gluteals       Treatment Performed:      Therapeutic Exercise:    30 min  Bike 10 minutes for ROM rocking  Supine lying pillow 3 minutes   Isometrics- glutes, quad, ab-HEP   Hip abd iso 2 x 10 5\" hold   SAQ over ball 2 x 10 5\" hold   Quad rock back 15 x 2  Heel slides 3 minutes " "  BKFO 2 x 10   Bridges 2 x 10 with ball squeeze  Prone quad stretch 3 x 30\"   Prone plank 3 x 15\"   Wt shifts forward and lateral 10 x 10\" hold   Prone ER/IR ADD    Manual Therapy:    30 min  STM to R quad, adductors, hip flexors, gluteals, ITB   PROM R hip flexion, abd, ER   Log rolling   Scar massage   Neuromuscular re-education 5 minutes   Biofeedback quad 5 minutes     Other:     0 min  Declined ice to home       PT Therapeutic Procedures Time Entry  Manual Therapy Time Entry: 30  Neuromuscular Re-Education Time Entry: 5  Therapeutic Exercise Time Entry: 30                      Assessment:   The focus of today's session was flexibility/ROM  and education. Patient appropriately challenged by therapeutic exercise and was able to complete with fatigue at end of session. The patient is still limited in overall strength, flexibility, motor control and pain  and range of motion at this time. Patient progressing well overall with therapy and continues to require skilled care to address motor control, strength, flexibility and functional deficits. Gait training performed to encourage hip extension and neutral foot posture. Pt able to accept more weight onto R LE today versus previous session. Patient has stiffness in her knee as well, addressed with manual and heel slides. Pt to continue on two crutches for community ambulation due gait deviations.         Plan:  Continue to progress per protocol.       Vivian Jaime, PT    "

## 2025-06-05 ENCOUNTER — APPOINTMENT (OUTPATIENT)
Dept: PHYSICAL THERAPY | Facility: CLINIC | Age: 20
End: 2025-06-05
Payer: COMMERCIAL

## 2025-06-09 ENCOUNTER — TREATMENT (OUTPATIENT)
Dept: PHYSICAL THERAPY | Facility: HOSPITAL | Age: 20
End: 2025-06-09
Payer: COMMERCIAL

## 2025-06-09 DIAGNOSIS — Z47.89 ORTHOPEDIC AFTERCARE: ICD-10-CM

## 2025-06-09 DIAGNOSIS — Q65.89 FEMORAL ANTEVERSION OF RIGHT LOWER EXTREMITY: ICD-10-CM

## 2025-06-09 DIAGNOSIS — M25.551 RIGHT HIP PAIN: ICD-10-CM

## 2025-06-09 DIAGNOSIS — M25.851 FEMOROACETABULAR IMPINGEMENT OF RIGHT HIP: ICD-10-CM

## 2025-06-09 PROCEDURE — 97110 THERAPEUTIC EXERCISES: CPT | Mod: GP

## 2025-06-09 PROCEDURE — 97140 MANUAL THERAPY 1/> REGIONS: CPT | Mod: GP

## 2025-06-09 NOTE — PROGRESS NOTES
"  Physical Therapy  Physical Therapy Treatment Note    Patient Name: Rosalva Castaneda  MRN: [id number]  Today's Date: 6/9/2025  Time Calculation  Start Time: 1330  Stop Time: 1435  Time Calculation (min): 65 min    Insurance:  Visit number: 4 of 30  Authorization info: No auth needed (soft limit   Insurance Type: Medical [facility]    General:  Reason for visit:  s/p right hip arthroscopy with rim trim subspinous decompression, acetabular labral repair, 3-anchor looped configuration for a tear extending from the 12 to 3 o'clock position, Intra-articular loose body removal, Femoral osteochondroplasty for CAM lesion, Capsular plication by Dr. Mcneal and   femoral derotation with ronan placement by Dr. Fulton on 5/12   Referred by: Dr. Fulton/Flori Chavira PA-C   School: [employer]  Sport: basketball     Current Problem  1. Femoroacetabular impingement of right hip  Follow Up In Physical Therapy      2. Femoral anteversion of right lower extremity  Follow Up In Physical Therapy      3. Right hip pain  Follow Up In Physical Therapy      4. Orthopedic aftercare  Follow Up In Physical Therapy            Precautions: WBAT, follow standard hip labral repair.Hx of migraines   Precautions  STEADI Fall Risk Score (The score of 4 or more indicates an increased risk of falling): 1      Subjective:     Patient reports she is continuing to have knee pain (distal quad) and lateral hip. Pain at rest and with walking. She has been taking Advil for pain relief and pain meds at night     Pain   6/10     Performing HEP?: Yes      Objective:   R hip PROM flexion to 90 deg   IR to 40     + hypertonicity to R quad, ITB, hamstrings, hip flexors, gluteals   Weakness in quad with full WB R LE     Treatment Performed:      Therapeutic Exercise:    35 min  Bike 10 minutes for ROM rocking  Supine lying pillow 3 minutes -NT  Hip abd iso 2 x 10 5\" hold   TKE green 2 x 10 5\" hold   Quad rock back 15 x 2-HEP today  Heel slides 3 minutes   BKFO 2 x 10 " "  Bridges 3 x 8  Prone quad stretch 3 x 30\"   Prone plank 2 x 30\"   SLS 3 x 30\" with UE support   Prone ER/IR 2 x 15   Supine clamshell OTB 2 x 15     Manual Therapy:    30 min  STM to R quad, adductors, hip flexors, gluteals, ITB   PROM R hip flexion, abd, ER   Prone ER/IR   Log rolling   Scar massage   Neuromuscular re-education 5 minutes -NT  Biofeedback quad 5 minutes     Other:     0 min  Declined ice to home       PT Therapeutic Procedures Time Entry  Manual Therapy Time Entry: 30  Therapeutic Exercise Time Entry: 35                      Assessment:   Improved knee AAROM noted today versus previous session. Continues to have scar tissue on the lateral aspect of her knee and upper thigh which are giving her discomfort. Functional weakness noted in patients glutes and quad with WB, recommended patient continuing using crutch until she has better control of her quad. Mild soreness and fatigue in hip by end of session.         Plan:  Continue to progress per protocol.       Vivian Jaime, PT    "

## 2025-06-10 ENCOUNTER — APPOINTMENT (OUTPATIENT)
Dept: PHYSICAL THERAPY | Facility: CLINIC | Age: 20
End: 2025-06-10
Payer: COMMERCIAL

## 2025-06-11 ENCOUNTER — TREATMENT (OUTPATIENT)
Dept: PHYSICAL THERAPY | Facility: HOSPITAL | Age: 20
End: 2025-06-11
Payer: COMMERCIAL

## 2025-06-11 DIAGNOSIS — M25.851 FEMOROACETABULAR IMPINGEMENT OF RIGHT HIP: Primary | ICD-10-CM

## 2025-06-11 DIAGNOSIS — Q65.89 FEMORAL ANTEVERSION OF RIGHT LOWER EXTREMITY: ICD-10-CM

## 2025-06-11 DIAGNOSIS — Z47.89 ORTHOPEDIC AFTERCARE: ICD-10-CM

## 2025-06-11 DIAGNOSIS — M25.551 RIGHT HIP PAIN: ICD-10-CM

## 2025-06-11 PROCEDURE — 97110 THERAPEUTIC EXERCISES: CPT | Mod: GP

## 2025-06-11 PROCEDURE — 97112 NEUROMUSCULAR REEDUCATION: CPT | Mod: GP

## 2025-06-11 PROCEDURE — 97140 MANUAL THERAPY 1/> REGIONS: CPT | Mod: GP

## 2025-06-11 ASSESSMENT — PAIN SCALES - GENERAL: PAINLEVEL_OUTOF10: 5 - MODERATE PAIN

## 2025-06-11 ASSESSMENT — PAIN - FUNCTIONAL ASSESSMENT: PAIN_FUNCTIONAL_ASSESSMENT: 0-10

## 2025-06-11 NOTE — PROGRESS NOTES
"  Physical Therapy  Physical Therapy Treatment Note    Patient Name: Rosalva Castaneda  MRN: [id number]  Today's Date: 6/11/2025  Time Calculation  Start Time: 1330  Stop Time: 1440  Time Calculation (min): 70 min    Insurance:  Visit number: 5 of 30  Authorization info: No auth needed (soft limit   Insurance Type: Medical [facility]    General:  Reason for visit:  s/p right hip arthroscopy with rim trim subspinous decompression, acetabular labral repair, 3-anchor looped configuration for a tear extending from the 12 to 3 o'clock position, Intra-articular loose body removal, Femoral osteochondroplasty for CAM lesion, Capsular plication by Dr. Mcneal and   femoral derotation with ronan placement by Dr. Fulton on 5/12   Referred by: Dr. Fulton/Flori Chavira PA-C   School: [employer]  Sport: basketball     Current Problem  1. Femoroacetabular impingement of right hip  Follow Up In Physical Therapy      2. Femoral anteversion of right lower extremity  Follow Up In Physical Therapy      3. Right hip pain  Follow Up In Physical Therapy      4. Orthopedic aftercare  Follow Up In Physical Therapy              Precautions: WBAT, follow standard hip labral repair.Hx of migraines   Precautions  STEADI Fall Risk Score (The score of 4 or more indicates an increased risk of falling): 1      Subjective:     Patient reports pain is a little bit better today but noticing it more in the hip versus knee. Knee pain is feeling deeper and less muscle.     Pain  Pain Assessment: 0-10  0-10 (Numeric) Pain Score: 5 - Moderate pain6/10     Performing HEP?: Yes      Objective:   Amb into clinic with one crutch   R hip PROM flexion to 106 deg   IR to 40, ER to 30 deg     + hypertonicity to R quad, ITB, hamstrings, hip flexors, gluteals   Weakness in quad with full WB R LE     Treatment Performed:      Therapeutic Exercise:    30 min  Bike 5 minutes   Standing hip abd and extension WB on L LE 2 x 10   Hip abd iso 2 x 10 5\" hold   Quad rock back 15 x " "2-HEP today  BKFO 2 x 10 -hep today   Bridges 3 x 8-HEP   Prone quad stretch 3 x 30\"   Prone plank 2 x 30\"   SLS 3 x 30\" with UE support   Prone ER/IR 2 x 15   Mini squat to table 2 x 10   Prone heel squeeze 2 x 10 5\" hold     Supine clamshell OTB 2 x 15 -hep today    Manual Therapy:    30 min  STM to R quad, adductors, hip flexors, gluteals, ITB   PROM R hip ER/IR, abd, prone ER/IR  Scar massage   LAD   Gentle joint mobilizations lateral  Hip scooping mob   Neuromuscular re-education 10 minutes   Biofeedback quad sets, LAQ, TKE 10 minutes, max 1200      Other:     0 min  Declined ice to home       PT Therapeutic Procedures Time Entry  Therapeutic Exercise Time Entry: 30  Neuromuscular Re-Education Time Entry: 10  Manual Therapy Time Entry: 30                      Assessment:   Pt continues to have weakness in her R quad which is impacting her gait, knee is buckling with full weight through her R LE. Pt fatigued with isolated quad activation and responded well to biofeedback with improved activation afterwards. Patient felt relief of hip pain after initiation of joint mobilizations. Updated HEP for home.         Plan:  Continue to progress per protocol. Progress quad strength, use BFR.       Vivian Jaime, PT    "

## 2025-06-12 ENCOUNTER — APPOINTMENT (OUTPATIENT)
Dept: PHYSICAL THERAPY | Facility: CLINIC | Age: 20
End: 2025-06-12
Payer: COMMERCIAL

## 2025-06-16 ENCOUNTER — TREATMENT (OUTPATIENT)
Dept: PHYSICAL THERAPY | Facility: HOSPITAL | Age: 20
End: 2025-06-16
Payer: COMMERCIAL

## 2025-06-16 DIAGNOSIS — Q65.89 FEMORAL ANTEVERSION OF RIGHT LOWER EXTREMITY: ICD-10-CM

## 2025-06-16 DIAGNOSIS — Z47.89 ORTHOPEDIC AFTERCARE: ICD-10-CM

## 2025-06-16 DIAGNOSIS — M25.551 RIGHT HIP PAIN: ICD-10-CM

## 2025-06-16 DIAGNOSIS — M25.851 FEMOROACETABULAR IMPINGEMENT OF RIGHT HIP: ICD-10-CM

## 2025-06-16 PROCEDURE — 97140 MANUAL THERAPY 1/> REGIONS: CPT | Mod: GP

## 2025-06-16 PROCEDURE — 97110 THERAPEUTIC EXERCISES: CPT | Mod: GP

## 2025-06-16 ASSESSMENT — PAIN SCALES - GENERAL: PAINLEVEL_OUTOF10: 5 - MODERATE PAIN

## 2025-06-16 ASSESSMENT — PAIN - FUNCTIONAL ASSESSMENT: PAIN_FUNCTIONAL_ASSESSMENT: 0-10

## 2025-06-16 NOTE — PROGRESS NOTES
"  Physical Therapy  Physical Therapy Treatment Note    Patient Name: Rosalva Castaneda  MRN: [id number]  Today's Date: 6/16/2025  Time Calculation  Start Time: 1335  Stop Time: 1450  Time Calculation (min): 75 min    Insurance:  Visit number: 6 of 30  Authorization info: No auth needed (soft limit   Insurance Type: Medical [facility]    General:  Reason for visit:  s/p right hip arthroscopy with rim trim subspinous decompression, acetabular labral repair, 3-anchor looped configuration for a tear extending from the 12 to 3 o'clock position, Intra-articular loose body removal, Femoral osteochondroplasty for CAM lesion, Capsular plication by Dr. Mcneal and   femoral derotation with ronan placement by Dr. Fulton on 5/12   Referred by: Dr. Fulton/Flori Chavira PA-C   School: [employer]  Sport: basketball     Current Problem  1. Femoroacetabular impingement of right hip  Follow Up In Physical Therapy      2. Femoral anteversion of right lower extremity  Follow Up In Physical Therapy      3. Right hip pain  Follow Up In Physical Therapy      4. Orthopedic aftercare  Follow Up In Physical Therapy          Precautions: WBAT, follow standard hip labral repair.Hx of migraines   Precautions  STEADI Fall Risk Score (The score of 4 or more indicates an increased risk of falling): 1      Subjective:     Patient reports her hip is feeling a little bit better overall. She still does not trust her leg on her surgical side to take her full weight with weight bearing.     Pain   5/10     Performing HEP?: Yes      Objective:   Amb into clinic with one crutch   R hip PROM flexion to 110 deg   IR to 40, ER to 30 deg     + hypertonicity to R quad, ITB, hamstrings, hip flexors, gluteals   Weakness in quad with full WB R LE     Treatment Performed:      Therapeutic Exercise:    40 min  Bike 10 minutes   Quad rock back 15 x 2-HEP today  BKFO 2 x 10  Bridges 2 x 10, 10 x with ball squeeze   Prone quad stretch 3 x 30\" with 1/2 foam roll   BFR 70% " "LOP 30, 15,15,15  LAQ  Supine clamshells RTB   SLS 3 x 30\" with UE support   Side stepping at table 4 x 5 steps   Clamshell 3 x 8   Glute med activation against wall 2 x 6 5\" hold   TKE 3 x 10 5\" hold red   Prone ER/IR 2 x 15     NT     Mini squat to table 2 x 10   Prone heel squeeze 2 x 10 5\" hold     Supine clamshell OTB 2 x 15 -hep today  Prone plank 2 x 30\"   Standing hip abd and extension WB on L LE 2 x 10     Manual Therapy:    30 min  STM to R quad, adductors, hip flexors, gluteals, ITB   PROM R hip ER/IR, abd  Scar massage   LAD   Gentle joint mobilizations lateral  Hip scooping mob   Neuromuscular re-education 10 minutes   Biofeedback quad sets, LAQ, TKE 10 minutes, max 1200      Other:     0 min  Declined ice to home       PT Therapeutic Procedures Time Entry  Therapeutic Exercise Time Entry: 40  Manual Therapy Time Entry: 30                      Assessment:   Biggest limitations currently is her weakness with WB on R LE. Initiated BFR for strengthening today for muscle hypertrophy. Patient educated on blood flow restrictions precautions, indications and contraindications. Patient is aware of risks and benefits of modality and provided informed consent. No adverse reactions to BFR, pt fatigued by the end. Pt responded well to progressive WB exercises today to activate her muscles. Fatigued by end of session, no complaints of increased pain.         Plan:  Continue to progress per protocol. Progress quad strength, use BFR.       Vivian Jaime, PT    "

## 2025-06-17 ENCOUNTER — APPOINTMENT (OUTPATIENT)
Dept: PHYSICAL THERAPY | Facility: CLINIC | Age: 20
End: 2025-06-17
Payer: COMMERCIAL

## 2025-06-18 ENCOUNTER — TREATMENT (OUTPATIENT)
Dept: PHYSICAL THERAPY | Facility: HOSPITAL | Age: 20
End: 2025-06-18
Payer: COMMERCIAL

## 2025-06-18 DIAGNOSIS — Z47.89 ORTHOPEDIC AFTERCARE: ICD-10-CM

## 2025-06-18 DIAGNOSIS — Q65.89 FEMORAL ANTEVERSION OF RIGHT LOWER EXTREMITY: ICD-10-CM

## 2025-06-18 DIAGNOSIS — M25.551 RIGHT HIP PAIN: ICD-10-CM

## 2025-06-18 DIAGNOSIS — M25.851 FEMOROACETABULAR IMPINGEMENT OF RIGHT HIP: ICD-10-CM

## 2025-06-18 PROCEDURE — 97140 MANUAL THERAPY 1/> REGIONS: CPT | Mod: GP

## 2025-06-18 PROCEDURE — 97110 THERAPEUTIC EXERCISES: CPT | Mod: GP

## 2025-06-18 ASSESSMENT — PAIN - FUNCTIONAL ASSESSMENT: PAIN_FUNCTIONAL_ASSESSMENT: 0-10

## 2025-06-18 ASSESSMENT — PAIN SCALES - GENERAL: PAINLEVEL_OUTOF10: 7

## 2025-06-18 NOTE — PROGRESS NOTES
"  Physical Therapy  Physical Therapy Treatment Note    Patient Name: Rosalva Castaneda  MRN: [id number]  Today's Date: 6/18/2025  Time Calculation  Start Time: 1330  Stop Time: 1440  Time Calculation (min): 70 min    Insurance:  Visit number: 7 of 30  Authorization info: No auth needed (soft limit   Insurance Type: Medical [facility]    General:  Reason for visit:  s/p right hip arthroscopy with rim trim subspinous decompression, acetabular labral repair, 3-anchor looped configuration for a tear extending from the 12 to 3 o'clock position, Intra-articular loose body removal, Femoral osteochondroplasty for CAM lesion, Capsular plication by Dr. Mcneal and   femoral derotation with ronan placement by Dr. Fulton on 5/12   Referred by: Dr. Fulton/Flori Chavira PA-C   School: [employer]  Sport: basketball     Current Problem  1. Femoroacetabular impingement of right hip  Follow Up In Physical Therapy      2. Femoral anteversion of right lower extremity  Follow Up In Physical Therapy      3. Right hip pain  Follow Up In Physical Therapy      4. Orthopedic aftercare  Follow Up In Physical Therapy          Precautions: WBAT, follow standard hip labral repair.Hx of migraines   Precautions  STEADI Fall Risk Score (The score of 4 or more indicates an increased risk of falling): 1      Subjective:     Patient reports her hip is sore today. She states she gets a pulling and tightness in her hip with weight bearing.     Pain  Pain Assessment: 0-10  0-10 (Numeric) Pain Score: 77/10     Performing HEP?: Yes      Objective:   Amb into clinic with one crutch   R hip PROM flexion to 112 deg   IR to 40, ER to 30 deg     + hypertonicity to R quad, ITB, hamstrings, hip flexors, gluteals   Weakness in quad with full WB R LE     Treatment Performed:      Therapeutic Exercise:    40 min  Bike 10 minutes   Quad rock back 15 x 2-HEP today  BKFO 2 x 10  Bridges 2 x 10, 10 x with ball squeeze -NT  Prone quad stretch 3 x 30\" with 1/2 foam roll   BFR " "70% LOP 30, 15,15,15  LAQ  Weight shifts 10 x 10\" forward and lateral with glute activation   Side stepping at table 4 x 5 steps   Clamshell 3 x 8 with pillow between knees  Prone ER/IR 2 x 15   Stool rotations 2 x 10 R   NT  Glute med activation against wall 2 x 6 5\" hold   TKE 3 x 10 5\" hold red       NT     Mini squat to table 2 x 10   Prone heel squeeze 2 x 10 5\" hold     Supine clamshell OTB 2 x 15 -hep today  Prone plank 2 x 30\"   Standing hip abd and extension WB on L LE 2 x 10     Manual Therapy:    30 min  STM to R quad, adductors, hip flexors, gluteals, ITB   PROM R hip ER/IR, abd  Scar massage   LAD   Gentle joint mobilizations lateral  Hip scooping mob   Neuromuscular re-education 10 minutes -NT  Biofeedback quad sets, LAQ, TKE 10 minutes, max 1200      Other:     0 min  Declined ice to home       PT Therapeutic Procedures Time Entry  Therapeutic Exercise Time Entry: 40  Manual Therapy Time Entry: 30                      Assessment:   Biggest limitations currently is her weakness with WB on R LE. Pt demonstrating improved quad activation with exercise and during weight bearing. Reduced symptoms in hip after stretching and standing exercises. Cued provided to engage gluteals prior to shifting weight which helped with weight transfer. PT to continue with isometrics and isolated gluteal exercises at home in addition to mobility. Discussed using two crutches if having higher pain levels.         Plan:  Continue to progress per protocol. Progress quad strength, use BFR.       Vivian Jaime, PT    "

## 2025-06-19 ENCOUNTER — APPOINTMENT (OUTPATIENT)
Dept: PHYSICAL THERAPY | Facility: CLINIC | Age: 20
End: 2025-06-19
Payer: COMMERCIAL

## 2025-06-23 ENCOUNTER — TREATMENT (OUTPATIENT)
Dept: PHYSICAL THERAPY | Facility: HOSPITAL | Age: 20
End: 2025-06-23
Payer: COMMERCIAL

## 2025-06-23 DIAGNOSIS — Q65.89 FEMORAL ANTEVERSION OF RIGHT LOWER EXTREMITY: ICD-10-CM

## 2025-06-23 DIAGNOSIS — Z47.89 ORTHOPEDIC AFTERCARE: ICD-10-CM

## 2025-06-23 DIAGNOSIS — M25.851 FEMOROACETABULAR IMPINGEMENT OF RIGHT HIP: ICD-10-CM

## 2025-06-23 DIAGNOSIS — M25.551 RIGHT HIP PAIN: ICD-10-CM

## 2025-06-23 PROCEDURE — 97110 THERAPEUTIC EXERCISES: CPT | Mod: GP

## 2025-06-23 PROCEDURE — 97140 MANUAL THERAPY 1/> REGIONS: CPT | Mod: GP

## 2025-06-23 NOTE — PROGRESS NOTES
"  Physical Therapy  Physical Therapy Treatment Note    Patient Name: Rosalva Castaneda  MRN: [id number]  Today's Date: 6/23/2025  Time Calculation  Start Time: 1330  Stop Time: 1445  Time Calculation (min): 75 min    Insurance:  Visit number: 8 of 30  Authorization info: No auth needed (soft limit   Insurance Type: Medical [facility]    General:  Reason for visit:  s/p right hip arthroscopy with rim trim subspinous decompression, acetabular labral repair, 3-anchor looped configuration for a tear extending from the 12 to 3 o'clock position, Intra-articular loose body removal, Femoral osteochondroplasty for CAM lesion, Capsular plication by Dr. Mcneal and   femoral derotation with ronan placement by Dr. Fulton on 5/12   Referred by: Dr. Fulton/Flori Chavira PA-C   School: [employer]  Sport: basketball     Current Problem  1. Femoroacetabular impingement of right hip  Follow Up In Physical Therapy      2. Femoral anteversion of right lower extremity  Follow Up In Physical Therapy      3. Right hip pain  Follow Up In Physical Therapy      4. Orthopedic aftercare  Follow Up In Physical Therapy            Precautions: WBAT, follow standard hip labral repair.Hx of migraines   Precautions  STEADI Fall Risk Score (The score of 4 or more indicates an increased risk of falling): 1      Subjective:     Patient reports her hip is feeling better overall- walking better and having less pain.     Pain   4/10     Performing HEP?: Yes      Objective:   Amb into clinic with one crutch   R hip PROM flexion to 120 deg   IR to 45, ER to 37 deg     + hypertonicity to R quad, ITB, hamstrings, hip flexors, gluteals   Weakness in quad with full WB R LE     Treatment Performed:      Therapeutic Exercise:    40 min  Bike 6 minutes   Standing hip flexor stretch 3 x 30\"   BKFO 2 x 10  Bridges 2 x 10, 10 x with ball squeeze   SL bridge 8 x (felt in groin)  Prone quad stretch 3 x 30\" with 1/2 foam roll   Prone plank 2 x 1 minute  SLS with L toe down " "red band at knees 3 x  1 minute  Squat to table RTB at kenes 3 x 10   Side stepping at table 4 x 5 steps   Alter G walking 60% BW 6 minutes 1.0mph for normalizing gait   NT  Clamshell 3 x 8 with pillow between knees  Prone ER/IR 2 x 15 YTB  Stool rotations 2 x 10 R   Glute med activation against wall 2 x 6 5\" hold   TKE 3 x 10 5\" hold red   NT   BFR 70% LOP 30, 15,15,15-NT  LAQ      Manual Therapy:    30 min  STM to R quad, adductors, hip flexors, gluteals, ITB  Scar massage   Joint mobilizations with belt- caudal, lateral MWM ER  PRONE hip PA mob  Hip scooping mob   Neuromuscular re-education 10 minutes -NT  Biofeedback quad sets, LAQ, TKE 10 minutes, max 1200      Other:     0 min  Declined ice to home       PT Therapeutic Procedures Time Entry  Therapeutic Exercise Time Entry: 40  Manual Therapy Time Entry: 30                      Assessment:   Improved quad activation noted today during session, continues to have difficulty accepting full weight onto her surgical limb. Utilized band at knees to assist gluteal activation. Patient did well with alter G walking, cued for heel toe gait pattern which helped reduce lateral trunk lean. Pt able to apply this on land with improvement in gait overall.         Plan:  Continue to progress per protocol. Progress quad strength, use BFR.       Vivian Jaime, PT    "

## 2025-06-24 ENCOUNTER — APPOINTMENT (OUTPATIENT)
Dept: PHYSICAL THERAPY | Facility: CLINIC | Age: 20
End: 2025-06-24
Payer: COMMERCIAL

## 2025-06-25 ENCOUNTER — HOSPITAL ENCOUNTER (OUTPATIENT)
Dept: RADIOLOGY | Facility: CLINIC | Age: 20
Discharge: HOME | End: 2025-06-25
Payer: COMMERCIAL

## 2025-06-25 ENCOUNTER — OFFICE VISIT (OUTPATIENT)
Dept: ORTHOPEDIC SURGERY | Facility: CLINIC | Age: 20
End: 2025-06-25
Payer: COMMERCIAL

## 2025-06-25 ENCOUNTER — TREATMENT (OUTPATIENT)
Dept: PHYSICAL THERAPY | Facility: HOSPITAL | Age: 20
End: 2025-06-25
Payer: COMMERCIAL

## 2025-06-25 DIAGNOSIS — M25.851 FEMOROACETABULAR IMPINGEMENT OF RIGHT HIP: ICD-10-CM

## 2025-06-25 DIAGNOSIS — M25.551 RIGHT HIP PAIN: ICD-10-CM

## 2025-06-25 DIAGNOSIS — M25.851 FEMOROACETABULAR IMPINGEMENT OF RIGHT HIP: Primary | ICD-10-CM

## 2025-06-25 DIAGNOSIS — Z47.89 ORTHOPEDIC AFTERCARE: ICD-10-CM

## 2025-06-25 DIAGNOSIS — Q65.89 FEMORAL ANTEVERSION OF RIGHT LOWER EXTREMITY: ICD-10-CM

## 2025-06-25 PROCEDURE — 72170 X-RAY EXAM OF PELVIS: CPT | Performed by: RADIOLOGY

## 2025-06-25 PROCEDURE — 99024 POSTOP FOLLOW-UP VISIT: CPT | Performed by: PHYSICIAN ASSISTANT

## 2025-06-25 PROCEDURE — 73552 X-RAY EXAM OF FEMUR 2/>: CPT | Performed by: RADIOLOGY

## 2025-06-25 PROCEDURE — 97110 THERAPEUTIC EXERCISES: CPT | Mod: GP

## 2025-06-25 PROCEDURE — 97140 MANUAL THERAPY 1/> REGIONS: CPT | Mod: GP

## 2025-06-25 PROCEDURE — 73552 X-RAY EXAM OF FEMUR 2/>: CPT | Mod: RT

## 2025-06-25 PROCEDURE — 72170 X-RAY EXAM OF PELVIS: CPT

## 2025-06-25 NOTE — PROGRESS NOTES
"  Physical Therapy  Physical Therapy Treatment Note    Patient Name: Rosalva Castaneda  MRN: [id number]  Today's Date: 6/25/2025  Time Calculation  Start Time: 1330  Stop Time: 1425  Time Calculation (min): 55 min    Insurance:  Visit number: 9 of 30  Authorization info: No auth needed (soft limit   Insurance Type: Medical [facility]    General:  Reason for visit:  s/p right hip arthroscopy with rim trim subspinous decompression, acetabular labral repair, 3-anchor looped configuration for a tear extending from the 12 to 3 o'clock position, Intra-articular loose body removal, Femoral osteochondroplasty for CAM lesion, Capsular plication by Dr. Mcneal and   femoral derotation with ronan placement by Dr. Fulton on 5/12   Referred by: Dr. Fulton/Flori Chavira PA-C   School: [employer]  Sport: basketball   POW: 6     Current Problem  1. Femoroacetabular impingement of right hip  Follow Up In Physical Therapy      2. Femoral anteversion of right lower extremity  Follow Up In Physical Therapy      3. Right hip pain  Follow Up In Physical Therapy      4. Orthopedic aftercare  Follow Up In Physical Therapy            Precautions: WBAT, follow standard hip labral repair.Hx of migraines   Precautions  STEADI Fall Risk Score (The score of 4 or more indicates an increased risk of falling): 1      Subjective:     Patient reports her hip was sore yesterday and feeling better today. Noticing improvements overall although slow. She has MD follow up today. She leaves for vacation on Saturday and will be gone next week.     Pain   4/10     Performing HEP?: Yes      Objective:   Amb into clinic with one crutch  Amb into clinic with one crutch   R hip PROM flexion to 120 deg   IR to 45, ER to 37 deg     + hypertonicity to R quad, ITB, hamstrings, hip flexors, gluteals   Weakness in quad with full WB R LE     Treatment Performed:      Therapeutic Exercise:    25 min  Bike 6 minutes   Standing hip flexor stretch 3 x 30\"   BKFO 2 x 10 with " "band  Bridges 2 x 10, with band and active ER   Supine legs extended hip abduction YTB 2 x 8 B with slider       NT  Prone quad stretch 3 x 30\" with 1/2 foam roll   Prone plank 2 x 1 minute  SLS with L toe down red band at knees 3 x  1 minute  Squat to table RTB at kenes 3 x 10   Side stepping at table 4 x 5 steps   Alter G walking 60% BW 6 minutes 1.0mph for normalizing gait   NT  Clamshell 3 x 8 with pillow between knees  Prone ER/IR 2 x 15 YTB  Stool rotations 2 x 10 R   Glute med activation against wall 2 x 6 5\" hold   TKE 3 x 10 5\" hold red   NT   BFR 70% LOP 30, 15,15,15-NT  LAQ      Manual Therapy:    30 min  STM to R quad, adductors, hip flexors, gluteals, ITB  Scar massage   Joint mobilizations with belt- caudal, lateral MWM ER  PRONE hip PA mob  Hip scooping mob   Neuromuscular re-education 10 minutes -NT  Biofeedback quad sets, LAQ, TKE 10 minutes, max 1200      Other:     0 min  Declined ice to home       PT Therapeutic Procedures Time Entry  Therapeutic Exercise Time Entry: 25  Manual Therapy Time Entry: 30                      Assessment:   Able to notice significant improvement in soft tissue restrictions and scar adhesions today. Adjusted exercises for home to perform glute med/max exercises in positions when she does not compensate. The focus of today's session was strengthening, flexibility/ROM , and joint mobilizations. Patient appropriately challenged by therapeutic exercise and was able to complete without increased pain. The patient is still limited in overall strength, flexibility, motor control and pain  at this time. Patient progressing well overall with therapy and continues to require skilled care to address motor control, strength, flexibility and functional deficits.       Pt provided with aquatic HEP as she will be on vacation next week and have access to a pool.     Plan:  Continue to progress per protocol. Utilize aquatic setting intermittently for strengthening.       Vivian" Add[place], PT

## 2025-06-26 ENCOUNTER — APPOINTMENT (OUTPATIENT)
Dept: PHYSICAL THERAPY | Facility: CLINIC | Age: 20
End: 2025-06-26
Payer: COMMERCIAL

## 2025-06-26 NOTE — PROGRESS NOTES
History of Present Illness   Rosalva Castaneda is a 20 y.o. female presenting today for follow up visit from combination right hip scope and PFO on 2025.  Continues to overall do well.  He is still in therapy and on a single crutch.  Feels that muscle weakness and some pain are limiting her progression.  She is unable to tolerate a full day before she fatigues without any crutches.  Incisions are all well-healed.  Has some numbness around the lateral thigh but otherwise sensation throughout the leg is intact.  No other complaints or concerns at this time.        Medical History[1]    Medication Documentation Review Audit       Reviewed by Charmaine Raza MA (Medical Assistant) on 25 at 1436      Medication Order Taking? Sig Documenting Provider Last Dose Status   Aimovig Autoinjector 140 mg/mL injection [id number] Yes Inject 1 mL (140 mg) under the skin every 28 (twenty-eight) days. Historical Provider, MD Past Week Active   cyclobenzaprine (Flexeril) 10 mg tablet [id number]  Take 1 tablet (10 mg) by mouth 3 times a day as needed for muscle spasms for up to 7 days. Flori Herring PA-C   25 2359   ergocalciferol (Vitamin D-2) 1250 mcg (50,000 units) capsule [id number] Yes Take 1 capsule (1.25 mg) by mouth 1 (one) time per week. Flori Herring PA-C  Active   ferrous sulfate 325 mg (65 mg elemental) tablet [id number] Yes Take 1 tablet by mouth twice a day. Historical Provider, MD  Active   gabapentin (Neurontin) 300 mg capsule [id number]  Take 1 capsule (300 mg) by mouth 3 times a day. Flori Herring PA-C   25 2359   ibuprofen 200 mg tablet [id number] Yes Take 3 tablets (600 mg) by mouth once daily as needed for mild pain (1 - 3). Historical Provider, MD Past Week Active   norgestimate-ethinyl estradiol (Ortho-Cyclen) 0.25-0.035 mg tablet [id number] Yes Take 1 tablet by mouth once daily. Historical Provider, MD  Active   pantoprazole (ProtoNix) 40 mg EC tablet [id number]  Take 1 tablet  (40 mg) by mouth once daily in the morning before meals. Do not crush, chew, or split. Flori Herring PA-C   25 [id number]   Winlevi 1 % cream [id number] Yes Apply topically 2 times a day. to affected area Historical Provider, MD  Active                    RX Allergies[2]    Social History     Socioeconomic History    Marital status: Single     Spouse name: Not on file    Number of children: Not on file    Years of education: Not on file    Highest education level: Not on file   Occupational History    Not on file   Tobacco Use    Smoking status: Never    Smokeless tobacco: Former   Substance and Sexual Activity    Alcohol use: Yes     Comment: occasional    Drug use: Never    Sexual activity: Not on file   Other Topics Concern    Not on file   Social History Narrative    Not on file     Social Drivers of Health     Financial Resource Strain: Low Risk  (2025)    Overall Financial Resource Strain (CARDIA)     Difficulty of Paying Living Expenses: Not hard at all   Food Insecurity: No Food Insecurity (2025)    Hunger Vital Sign     Worried About Running Out of Food in the Last Year: Never true     Ran Out of Food in the Last Year: Never true   Transportation Needs: No Transportation Needs (2025)    PRAPARE - Transportation     Lack of Transportation (Medical): No     Lack of Transportation (Non-Medical): No   Physical Activity: Sufficiently Active (2024)    Received from [employer]    Exercise Vital Sign     Days of Exercise per Week: 4 days     Minutes of Exercise per Session: 150+ min   Stress: Stress Concern Present (2024)    Received from [employer]    [place] [employer] - Occupational Stress Questionnaire     Feeling of Stress : Rather much   Social Connections: Moderately Isolated (2024)    Received from [employer]    Social Connection and Isolation Panel [NHANES]     Frequency of Communication with Friends and Family: More than three times a week      Frequency of Social Gatherings with Friends and Family: More than three times a week     Attends Hinduism Services: Never     Active Member of Clubs or Organizations: Yes     Attends Club or Organization Meetings: More than 4 times per year     Marital Status: Never    Intimate Partner Violence: Not At Risk (5/12/2025)    Humiliation, Afraid, Rape, and Kick questionnaire     Fear of Current or Ex-Partner: No     Emotionally Abused: No     Physically Abused: No     Sexually Abused: No   Housing Stability: Low Risk  (5/12/2025)    Housing Stability Vital Sign     Unable to Pay for Housing in the Last Year: No     Number of Times Moved in the Last Year: 0     Homeless in the Last Year: No       Surgical History[3]       Review of Systems:  ROS reviewed and negative other than as listed in the HPI     Physical Exam:  Gen: The pt is A&Ox3, NAD, and appear state age and weight  Psychiatric: mood and affect are appropriate   Eyes: sclera are white, EOM grossly intact  ENT: MMM  Neck: supple, thyroid is midline  Respiratory: respirations are nonlabored, chest rise symmetric  CV: rate is regular by palpation of distal pulses  Abdomen: nondistended   Integument: no obvious cutaneous lesions noted. No signs of lymphangitis. No signs of systemic edema.   MSK: Right hip surgical incisions well healed without edema, erythema, drainage, or other s/sx of infection. Appropriately tender to palpation around the incision. SILT throughout the leg intact. Intact plantarflexion and dorsiflexion. Foot warm and well perfused.      Imaging:  I personally reviewed multiple views of the pelvis and left femur were obtained in the office today demonstrate maintenance of reduction, interval healing, and a stable position of the hardware.      Assessment   20 y.o. female post-op from right hip scope with PFO on 5/12/2025.     Plan:  I reviewed her imaging in detail with her and her mom.  She has shown great signs of healing at the  osteotomy site.  She can continue weightbearing as tolerated and working through therapy.  I reassured her that her progression is normal.    Follow-up 6 weeks with 2 views right femur and weightbearing AP pelvis.     All of the patient's questions/concerns address and they are in agreement with the plan.                      [1]   Past Medical History:  Diagnosis Date    ADHD     Migraines    [2] No Known Allergies  [3]   Past Surgical History:  Procedure Laterality Date    DENTAL SURGERY

## 2025-06-30 ENCOUNTER — APPOINTMENT (OUTPATIENT)
Dept: PHYSICAL THERAPY | Facility: HOSPITAL | Age: 20
End: 2025-06-30
Payer: COMMERCIAL

## 2025-07-08 ENCOUNTER — TREATMENT (OUTPATIENT)
Dept: PHYSICAL THERAPY | Facility: HOSPITAL | Age: 20
End: 2025-07-08
Payer: COMMERCIAL

## 2025-07-08 DIAGNOSIS — Q65.89 FEMORAL ANTEVERSION OF RIGHT LOWER EXTREMITY: ICD-10-CM

## 2025-07-08 DIAGNOSIS — M25.851 FEMOROACETABULAR IMPINGEMENT OF RIGHT HIP: ICD-10-CM

## 2025-07-08 DIAGNOSIS — M25.551 RIGHT HIP PAIN: ICD-10-CM

## 2025-07-08 DIAGNOSIS — Z47.89 ORTHOPEDIC AFTERCARE: ICD-10-CM

## 2025-07-08 PROCEDURE — 97140 MANUAL THERAPY 1/> REGIONS: CPT | Mod: GP

## 2025-07-08 PROCEDURE — 97110 THERAPEUTIC EXERCISES: CPT | Mod: GP

## 2025-07-08 ASSESSMENT — PAIN - FUNCTIONAL ASSESSMENT: PAIN_FUNCTIONAL_ASSESSMENT: 0-10

## 2025-07-08 ASSESSMENT — PAIN SCALES - GENERAL: PAINLEVEL_OUTOF10: 2

## 2025-07-08 NOTE — PROGRESS NOTES
"  Physical Therapy  Physical Therapy Treatment Note    Patient Name: Rosalva Castaneda  MRN: [id number]  Today's Date: 7/8/2025  Time Calculation  Start Time: 1300  Stop Time: 1405  Time Calculation (min): 65 min    Insurance:  Visit number: 10 of 30  Authorization info: No auth needed (soft limit   Insurance Type: Medical [facility]    General:  Reason for visit:  s/p right hip arthroscopy with rim trim subspinous decompression, acetabular labral repair, 3-anchor looped configuration for a tear extending from the 12 to 3 o'clock position, Intra-articular loose body removal, Femoral osteochondroplasty for CAM lesion, Capsular plication by Dr. Mcneal and   femoral derotation with ronan placement by Dr. Fulton on 5/12   Referred by: Dr. Fulton/Flori Chavira PA-C   School: [employer]  Sport: basketball   POW: 6     Current Problem  1. Femoroacetabular impingement of right hip  Follow Up In Physical Therapy      2. Femoral anteversion of right lower extremity  Follow Up In Physical Therapy      3. Right hip pain  Follow Up In Physical Therapy      4. Orthopedic aftercare  Follow Up In Physical Therapy            Precautions: WBAT, follow standard hip labral repair.Hx of migraines   Precautions  STEADI Fall Risk Score (The score of 4 or more indicates an increased risk of falling): 1      Subjective:     Patient reports her hip is feeling better overall. She returns from a week of vacation. Has been walking some without her crutches.     Pain  Pain Assessment: 0-10  0-10 (Numeric) Pain Score: 22/10     Performing HEP?: Yes      Objective:   Amb into clinic with one crutch    R hip PROM flexion to 120 deg   IR to 45, ER to 42 deg     + hypertonicity to R quad, ITB, hip flexors, gluteals         Treatment Performed:      Therapeutic Exercise:    35 min  Bike 6 minutes   Alter G walking 60% BW 6 minutes 1.0mph for normalizing gait   Standing hip flexor stretch 3 x 30\"   BKFO 2 x 12 with band  Bridges 3 x 10, with band and active " "ER RTB  S/l hip abduction 2 x 8   Quad glute med against wall 2 x 8 5\" hold   Side stepping 4 x 5 steps RTB   Jump  level 4 DL squat RTB at knees 3 x 10   SLS 3 x 1 minute  Seated hip flexion isometric 2 x 10 5\" hold   Seated ball squeeze with IR OTB 3 x 10         NT  Prone quad stretch 3 x 30\" with 1/2 foam roll   Prone plank 2 x 1 minute  SLS with L toe down red band at knees 3 x  1 minute  Squat to table RTB at kenes 3 x 10       NT  Clamshell 3 x 8 with pillow between knees  Prone ER/IR 2 x 15 YTB  Stool rotations 2 x 10 R   Glute med activation against wall 2 x 6 5\" hold   TKE 3 x 10 5\" hold red       Manual Therapy:    30 min  STM to R quad, adductors, hip flexors, gluteals, ITB  Joint mobilizations with belt- caudal, lateral MWM ER  PRONE hip PA mob    Neuromuscular re-education 10 minutes -NT  Biofeedback quad sets, LAQ, TKE 10 minutes, max 1200      Other:     0 min  Declined ice to home       PT Therapeutic Procedures Time Entry  Therapeutic Exercise Time Entry: 35  Manual Therapy Time Entry: 30                      Assessment:   Decreased lateral trunk lean noted with single leg stance and during ambulation. Pt cued to increase hip extension during ambulation, OK to wean off crutches as long as she does not have increased pain and she walks as she did during session today. Take crutches for longer distances due to fatigue. Patient demonstrated improved strength and stability with progressive resistance exercises today.     Plan:  Continue to progress per protocol. Utilize aquatic setting intermittently for strengthening.       Vivian Jaime, PT    "

## 2025-07-10 ENCOUNTER — TREATMENT (OUTPATIENT)
Dept: PHYSICAL THERAPY | Facility: HOSPITAL | Age: 20
End: 2025-07-10
Payer: COMMERCIAL

## 2025-07-10 DIAGNOSIS — Z47.89 ORTHOPEDIC AFTERCARE: ICD-10-CM

## 2025-07-10 DIAGNOSIS — M25.851 FEMOROACETABULAR IMPINGEMENT OF RIGHT HIP: ICD-10-CM

## 2025-07-10 DIAGNOSIS — Q65.89 FEMORAL ANTEVERSION OF RIGHT LOWER EXTREMITY: ICD-10-CM

## 2025-07-10 DIAGNOSIS — M25.551 RIGHT HIP PAIN: ICD-10-CM

## 2025-07-10 PROCEDURE — 97113 AQUATIC THERAPY/EXERCISES: CPT | Mod: GP

## 2025-07-10 PROCEDURE — 97110 THERAPEUTIC EXERCISES: CPT | Mod: GP

## 2025-07-10 PROCEDURE — 97016 VASOPNEUMATIC DEVICE THERAPY: CPT | Mod: GP

## 2025-07-10 PROCEDURE — 97140 MANUAL THERAPY 1/> REGIONS: CPT | Mod: GP

## 2025-07-10 NOTE — PROGRESS NOTES
"  Physical Therapy  Physical Therapy Treatment Note    Patient Name: Rosalva Castaneda  MRN: [id number]  Today's Date: 7/10/2025  Time Calculation  Start Time: 1300  Stop Time: 1440  Time Calculation (min): 100 min    Insurance:  Visit number: 11 of 30  Authorization info: No auth needed (soft limit   Insurance Type: Medical [facility]    General:  Reason for visit:  s/p right hip arthroscopy with rim trim subspinous decompression, acetabular labral repair, 3-anchor looped configuration for a tear extending from the 12 to 3 o'clock position, Intra-articular loose body removal, Femoral osteochondroplasty for CAM lesion, Capsular plication by Dr. Mcneal and   femoral derotation with ronan placement by Dr. Fulton on 5/12   Referred by: Dr. Fulton/Flori Chavira PA-C   School: [employer]  Sport: basketball   POW: 6     Current Problem  1. Femoroacetabular impingement of right hip  Follow Up In Physical Therapy      2. Femoral anteversion of right lower extremity  Follow Up In Physical Therapy      3. Right hip pain  Follow Up In Physical Therapy      4. Orthopedic aftercare  Follow Up In Physical Therapy          Precautions: WBAT, follow standard hip labral repair.Hx of migraines   Precautions  STEADI Fall Risk Score (The score of 4 or more indicates an increased risk of falling): 1      Subjective:     Patient reports her leg and hip were sore after last session. Still having some soreness in hip entering clinic. Amb without crutch into clinic.     Pain   4/10     Performing HEP?: Yes      Objective:       R hip PROM flexion to 122 deg   IR to 45, ER to 42 deg     + hypertonicity to R quad, ITB, hip flexors, gluteals         Treatment Performed:      Therapeutic Exercise:    20 min  BFR 70% LOP 30, 15,15,15   LAQ   Supine clamshell RTB   SL bridge 3 x 8   Prone quad stretch 3 x 30\"       NT  Bike 6 minutes   Alter G walking 60% BW 6 minutes 1.0mph for normalizing gait   Standing hip flexor stretch 3 x 30\"   BKFO 2 x 12 with " "band  Bridges 3 x 10, with band and active ER RTB  S/l hip abduction 2 x 8   Quad glute med against wall 2 x 8 5\" hold   Side stepping 4 x 5 steps RTB   Jump  level 4 DL squat RTB at knees 3 x 10   SLS 3 x 1 minute  Seated hip flexion isometric 2 x 10 5\" hold   Seated ball squeeze with IR OTB 3 x 10         NT  Prone quad stretch 3 x 30\" with 1/2 foam roll   Prone plank 2 x 1 minute  SLS with L toe down red band at knees 3 x  1 minute  Squat to table RTB at kenes 3 x 10       Manual Therapy:    25 min  STM to R quad, adductors, hip flexors, gluteals, ITB  Joint mobilizations with belt- caudal, lateral MWM ER  PRONE hip PA mob    Neuromuscular re-education 10 minutes -NT  Biofeedback quad sets, LAQ, TKE 10 minutes, max 1200      Aquatic therapy     30 minutes   Mid torso height  [place]rad walking 0.9 mph   Retro walking 0.7 mph   Lateral walking 0.5 mph   Hip hikes   Standing alt marches  Iliac crest height  Mini squats 3 x 10   Lunges 3 x 8   Other:     0 min  Vaso prone R hip 15 minutes after session.        PT Therapeutic Procedures Time Entry  Therapeutic Exercise Time Entry: 20  Aquatic Therapy Time Entry: 30  Manual Therapy Time Entry: 25  PT Modalities Time Entry  Vasopneumatic Devices Time Entry: 15                   Assessment:   Pt responded very well to strengthening and ambulation in aquatic setting. Able to progress single limb strengthening exercises while in the aquatic setting which requires less strength for her to perform.  Continues to ambulate with mild circumduction due to quad weakness. The focus of today's session was strengthening and flexibility/ROM . Patient appropriately challenged by therapeutic exercise and blood flow restriction and was able to complete without increased pain. The patient is still limited in overall strength, flexibility, motor control and pain  and range of motion at this time. Patient progressing well overall with therapy and continues to require skilled care to " address motor control, strength, flexibility and functional deficits.       Plan:  Continue to progress per protocol. Utilize aquatic setting intermittently for strengthening.       Vivian Jaime, PT

## 2025-07-14 ENCOUNTER — TREATMENT (OUTPATIENT)
Dept: PHYSICAL THERAPY | Facility: HOSPITAL | Age: 20
End: 2025-07-14
Payer: COMMERCIAL

## 2025-07-14 DIAGNOSIS — M25.551 RIGHT HIP PAIN: ICD-10-CM

## 2025-07-14 DIAGNOSIS — Q65.89 FEMORAL ANTEVERSION OF RIGHT LOWER EXTREMITY: ICD-10-CM

## 2025-07-14 DIAGNOSIS — M25.851 FEMOROACETABULAR IMPINGEMENT OF RIGHT HIP: ICD-10-CM

## 2025-07-14 DIAGNOSIS — Z47.89 ORTHOPEDIC AFTERCARE: ICD-10-CM

## 2025-07-14 PROCEDURE — 97110 THERAPEUTIC EXERCISES: CPT | Mod: GP

## 2025-07-14 PROCEDURE — 97140 MANUAL THERAPY 1/> REGIONS: CPT | Mod: GP

## 2025-07-14 NOTE — PROGRESS NOTES
"  Physical Therapy  Physical Therapy Treatment Note    Patient Name: Rosalva Castaneda  MRN: [id number]  Today's Date: 7/14/2025  Time Calculation  Start Time: 1110  Stop Time: 1210  Time Calculation (min): 60 min    Insurance:  Visit number: 12 of 30  Authorization info: No auth needed (soft limit   Insurance Type: Medical [facility]    General:  Reason for visit:  s/p right hip arthroscopy with rim trim subspinous decompression, acetabular labral repair, 3-anchor looped configuration for a tear extending from the 12 to 3 o'clock position, Intra-articular loose body removal, Femoral osteochondroplasty for CAM lesion, Capsular plication by Dr. Mcneal and   femoral derotation with ronan placement by Dr. Fulton on 5/12   Referred by: Dr. Fulton/Flori Chavira PA-C   School: [employer]  Sport: basketball   POW: 9     Current Problem  1. Femoroacetabular impingement of right hip  Follow Up In Physical Therapy      2. Femoral anteversion of right lower extremity  Follow Up In Physical Therapy      3. Right hip pain  Follow Up In Physical Therapy      4. Orthopedic aftercare  Follow Up In Physical Therapy            Precautions: WBAT, follow standard hip labral repair.Hx of migraines   Precautions  STEADI Fall Risk Score (The score of 4 or more indicates an increased risk of falling): 1      Subjective:     Patient reports her hip has been feeling better overall. She felt loose after aquatic session and felt like it helped.     Pain   4/10     Performing HEP?: Yes      Objective:       R hip PROM flexion to 124 deg   IR to 45, ER to 44 deg     + hypertonicity to R quad, ITB, hip flexors, gluteals         Treatment Performed:      Therapeutic Exercise:    35 min  Bike 6 minutes   Supine BKFO hold 2 x 30\"   Seated hip IR with ball squeeze 3 x 10 RTB   S/l hip abduction 3 x 8   Assisted SL rdl orange band 3 x 8   [place] squat to TKE RTB 3 x 10   Jump  level 4 DL squat RTB at knees 3 x 10 no band today   SL bridge 3 x 8 " "  DL bridge with ball squeeze 2 x 10 5\" hold   1/2 kneeling hip flexor stretch 3 x 30\"       NT    Alter G walking 60% BW 6 minutes 1.0mph for normalizing gait   Standing hip flexor stretch 3 x 30\"   BKFO 2 x 12 with band  Bridges 3 x 10, with band and active ER RTB  S/l hip abduction 2 x 8   Quad glute med against wall 2 x 8 5\" hold   Side stepping 4 x 5 steps RTB     SLS 3 x 1 minute  Seated hip flexion isometric 2 x 10 5\" hold   Seated ball squeeze with IR OTB 3 x 10         NT  Prone quad stretch 3 x 30\" with 1/2 foam roll   Prone plank 2 x 1 minute  SLS with L toe down red band at knees 3 x  1 minute  Squat to table RTB at kenes 3 x 10       Manual Therapy:    25 min  STM to R quad, adductors, hip flexors, gluteals, ITB  Joint mobilizations with belt- caudal, lateral MWM ER  PRONE hip PA mob in neutral and frog     Neuromuscular re-education 10 minutes -NT  Biofeedback quad sets, LAQ, TKE 10 minutes, max 1200      Aquatic therapy     30 minutes -not today  Mid torso height  Fowrad walking 0.9 mph   Retro walking 0.7 mph   Lateral walking 0.5 mph   Hip hikes   Standing alt marches  Iliac crest height  Mini squats 3 x 10   Lunges 3 x 8   Other:     0 min  Vaso prone R hip 15 minutes after session.        PT Therapeutic Procedures Time Entry  Therapeutic Exercise Time Entry: 35  Manual Therapy Time Entry: 25                      Assessment:   The focus of today's session was strengthening, flexibility/ROM , and joint mobilizations. Patient appropriately challenged by therapeutic exercise and was able to complete without increased pain. The patient is still limited in overall strength, flexibility, motor control and pain  at this time. Patient progressing well overall with therapy and continues to require skilled care to address motor control, strength, flexibility and functional deficits.   Continues to have mild antalgia with ambulation due to quad weakness.       Plan:  Continue to progress per protocol. Utilize " aquatic setting intermittently for strengthening.       Vivian Jaime, PT

## 2025-07-16 NOTE — PROGRESS NOTES
"  Physical Therapy  Physical Therapy Treatment Note    Patient Name: Rosalva Castaneda  MRN: [id number]  Today's Date: 7/17/2025  Time Calculation  Start Time: 0700  Stop Time: 0830  Time Calculation (min): 90 min    Insurance:  Visit number: 13 of 30  Authorization info: No auth needed (soft limit   Insurance Type: Medical [facility]    General:  Reason for visit:  s/p right hip arthroscopy with rim trim subspinous decompression, acetabular labral repair, 3-anchor looped configuration for a tear extending from the 12 to 3 o'clock position, Intra-articular loose body removal, Femoral osteochondroplasty for CAM lesion, Capsular plication by Dr. Mcneal and   femoral derotation with ronan placement by Dr. Fulton on 5/12   Referred by: Dr. Fulton/Flori Chavira PA-C   School: [employer]  Sport: basketball   POW: 9     Current Problem  1. Femoroacetabular impingement of right hip  Follow Up In Physical Therapy      2. Femoral anteversion of right lower extremity  Follow Up In Physical Therapy      3. Right hip pain  Follow Up In Physical Therapy      4. Orthopedic aftercare  Follow Up In Physical Therapy              Precautions: WBAT, follow standard hip labral repair.Hx of migraines   Precautions  STEADI Fall Risk Score (The score of 4 or more indicates an increased risk of falling): 1      Subjective:     Patient reports her hip was sore after previous session. Still has some soreness entering clinic.     Pain   4/10     Performing HEP?: Yes      Objective:       R hip PROM flexion to 124 deg   IR to 45, ER to 44 deg     + hypertonicity to R quad, ITB, hip flexors, gluteals         Treatment Performed:      Therapeutic Exercise:    30 min  Bike 6 minutes   Supine BKFO hold 2 x 30\"   BFR 70% LOP 30, 15,15,15  Bridges  Supine clam with red band  LAQ   1/2 kneeling hip flexor stretch 3 x 30\"     NT  Seated hip IR with ball squeeze 3 x 10 RTB   S/l hip abduction 3 x 8   Assisted SL rdl orange band 3 x 8   [place]awk squat to TKE " "RTB 3 x 10   Jump  level 4 DL squat RTB at knees 3 x 10 no band today   SL bridge 3 x 8   DL bridge with ball squeeze 2 x 10 5\" hold     NT    Alter G walking 60% BW 6 minutes 1.0mph for normalizing gait   Standing hip flexor stretch 3 x 30\"   BKFO 2 x 12 with band  Bridges 3 x 10, with band and active ER RTB  S/l hip abduction 2 x 8   Quad glute med against wall 2 x 8 5\" hold   Side stepping 4 x 5 steps RTB     NT  Prone quad stretch 3 x 30\" with 1/2 foam roll   Prone plank 2 x 1 minute  SLS with L toe down red band at knees 3 x  1 minute  Squat to table RTB at kenes 3 x 10       Manual Therapy:    20 min  STM to R quad, adductors, hip flexors, gluteals, ITB  Joint mobilizations with belt- caudal, lateral MWM ER  PRONE hip PA mob in neutral and frog     Neuromuscular re-education 10 minutes -NT  Biofeedback quad sets, LAQ, TKE 10 minutes, max 1200      Aquatic therapy    20 min   Mid torso height  Fowrad walking 0.9 mph   Retro walking 0.7 mph   Lateral walking 0.5 mph   Standing alt marches  Standing hip abduction   Iliac crest height  Mini squats 3 x 10   Other:     12 min  Vaso prone R hip 12 minutes after session contrast       PT Therapeutic Procedures Time Entry  Therapeutic Exercise Time Entry: 30  Aquatic Therapy Time Entry: 20  Manual Therapy Time Entry: 20  PT Modalities Time Entry  Vasopneumatic Devices Time Entry: 10                   Assessment:   Utilized aquatic setting due to soreness from previous session and to de weight her hip. Pt did wel in aquatic setting. Increased tension and soreness in hip flexors and TFL today, performed STM which helped to reduce symptoms. Pt continues to have mild gait deviations- recommended using a crutch when in [place] for longer distances of walking. Progressing well overall and continues to require skilled PT.       Plan:  Follow up after patient returns from [place] next week.       Vivian Jaime, PT    "

## 2025-07-17 ENCOUNTER — TREATMENT (OUTPATIENT)
Dept: PHYSICAL THERAPY | Facility: HOSPITAL | Age: 20
End: 2025-07-17
Payer: COMMERCIAL

## 2025-07-17 DIAGNOSIS — Z47.89 ORTHOPEDIC AFTERCARE: ICD-10-CM

## 2025-07-17 DIAGNOSIS — M25.551 RIGHT HIP PAIN: ICD-10-CM

## 2025-07-17 DIAGNOSIS — Q65.89 FEMORAL ANTEVERSION OF RIGHT LOWER EXTREMITY: ICD-10-CM

## 2025-07-17 DIAGNOSIS — M25.851 FEMOROACETABULAR IMPINGEMENT OF RIGHT HIP: ICD-10-CM

## 2025-07-17 PROCEDURE — 97140 MANUAL THERAPY 1/> REGIONS: CPT | Mod: GP

## 2025-07-17 PROCEDURE — 97113 AQUATIC THERAPY/EXERCISES: CPT | Mod: GP

## 2025-07-17 PROCEDURE — 97110 THERAPEUTIC EXERCISES: CPT | Mod: GP

## 2025-07-17 PROCEDURE — 97016 VASOPNEUMATIC DEVICE THERAPY: CPT | Mod: GP

## 2025-07-31 ENCOUNTER — TREATMENT (OUTPATIENT)
Dept: PHYSICAL THERAPY | Facility: HOSPITAL | Age: 20
End: 2025-07-31
Payer: COMMERCIAL

## 2025-07-31 DIAGNOSIS — M25.851 FEMOROACETABULAR IMPINGEMENT OF RIGHT HIP: ICD-10-CM

## 2025-07-31 DIAGNOSIS — Z47.89 ORTHOPEDIC AFTERCARE: ICD-10-CM

## 2025-07-31 DIAGNOSIS — M25.551 RIGHT HIP PAIN: ICD-10-CM

## 2025-07-31 DIAGNOSIS — Q65.89 FEMORAL ANTEVERSION OF RIGHT LOWER EXTREMITY: ICD-10-CM

## 2025-07-31 PROCEDURE — 97110 THERAPEUTIC EXERCISES: CPT | Mod: GP

## 2025-07-31 PROCEDURE — 97016 VASOPNEUMATIC DEVICE THERAPY: CPT | Mod: GP

## 2025-07-31 PROCEDURE — 97140 MANUAL THERAPY 1/> REGIONS: CPT | Mod: GP

## 2025-07-31 NOTE — PROGRESS NOTES
"  Physical Therapy  Physical Therapy Treatment Note    Patient Name: Rosalva Castaneda  MRN: [id number]  Today's Date: 7/31/2025  Time Calculation  Start Time: 1400  Stop Time: 1515  Time Calculation (min): 75 min    Insurance:  Visit number: 14 of 30  Authorization info: No auth needed (soft limit)  Insurance Type: [facility] [place] [employer] [place]    General:  Reason for visit:  s/p right hip arthroscopy with rim trim subspinous decompression, acetabular labral repair, 3-anchor looped configuration for a tear extending from the 12 to 3 o'clock position, Intra-articular loose body removal, Femoral osteochondroplasty for CAM lesion, Capsular plication by Dr. Mcneal and   femoral derotation with ronan placement by Dr. Fulton on 5/12   Referred by: Dr. Fulton/Flori Chavira PA-C   School: [employer]  Sport: basketball   POW: 11     Current Problem  1. Femoroacetabular impingement of right hip  Follow Up In Physical Therapy      2. Femoral anteversion of right lower extremity  Follow Up In Physical Therapy      3. Right hip pain  Follow Up In Physical Therapy      4. Orthopedic aftercare  Follow Up In Physical Therapy              Precautions: WBAT, follow standard hip labral repair.Hx of migraines   Precautions  STEADI Fall Risk Score (The score of 4 or more indicates an increased risk of falling): 1      Subjective:     Patient reports her hip has been feeling much better. She feels like she is walking more normal and having minimal pain. MD follow up next week and then patient returns to college in [place].     Pain   0/10     Performing HEP?: Yes      Objective:       R hip PROM flexion to 135 deg   IR to 50, ER to 35     + hypertonicity to R quad, ITB, hip flexors, gluteals         Treatment Performed:      Therapeutic Exercise:    45 min  Bike 6 minutes   Supine BKFO hold 2 x 30\" /piriformis stretch  Side [id number] RTB and monster walks 3 x 5 yards   12\" box step up 13# Kbs 3 x 10   Sumo squat 15# 3 x 10   Sled push/pull 35# 4 x 10 " "yards   Glue med hip abd at wall 3 x 5 5\" hold  Alt march iso hold at wall 2 x 10   Bridge with ER 3 x 10 GTB   1/2 kneeling hip flexor stretch 3 x 30\"     NT  Seated hip IR with ball squeeze 3 x 10 RTB   S/l hip abduction 3 x 8   Assisted SL rdl orange band 3 x 8   Italian squat to TKE RTB 3 x 10   Jump  level 4 DL squat RTB at knees 3 x 10 no band today   SL bridge 3 x 8   DL bridge with ball squeeze 2 x 10 5\" hold     NT  Alter G walking 60% BW 6 minutes 1.0mph for normalizing gait   Standing hip flexor stretch 3 x 30\"   BKFO 2 x 12 with band  Bridges 3 x 10, with band and active ER RTB  S/l hip abduction 2 x 8   Quad glute med against wall 2 x 8 5\" hold   Side stepping 4 x 5 steps RTB     Manual Therapy:    15 min  STM to R quad, adductors, hip flexors, gluteals, ITB  PRONE hip PA mob in neutral and frog     Neuromuscular re-education 10 minutes -NT  Biofeedback quad sets, LAQ, TKE 10 minutes, max 1200      Aquatic therapy    20 min -NT  Mid torso height  Fowrad walking 0.9 mph   Retro walking 0.7 mph   Lateral walking 0.5 mph   Standing alt marches  Standing hip abduction   Iliac crest height  Mini squats 3 x 10   Other:     12 min  Vaso prone R hip 12 minutes after session contrast       PT Therapeutic Procedures Time Entry  Therapeutic Exercise Time Entry: 45  Manual Therapy Time Entry: 15  PT Modalities Time Entry  Vasopneumatic Devices Time Entry: 12                   Assessment:   Significant improvement in mobility noted today during session. Continues to have a bit of tightness with external rotation but given her IR mobility I do not expect her to achieve > 45 deg ER. The focus of today's session was strengthening and flexibility/ROM . Patient appropriately challenged by therapeutic exercise and was able to complete with fatigue at end of session. The patient is still limited in overall strength, flexibility, motor control and pain  at this time. Patient progressing well overall with therapy and " continues to require skilled care to address motor control, strength, flexibility and functional deficits.     Updated HEP for patient to give to her PT in [place].     Plan:    Continue progressing strengthening. Pt will have another appointment with David Humphrey PT next week and then transition to PT in [place].     Vivian Jaime, PT

## 2025-08-04 ENCOUNTER — TREATMENT (OUTPATIENT)
Dept: PHYSICAL THERAPY | Facility: HOSPITAL | Age: 20
End: 2025-08-04
Payer: COMMERCIAL

## 2025-08-04 DIAGNOSIS — M25.551 RIGHT HIP PAIN: ICD-10-CM

## 2025-08-04 DIAGNOSIS — Z47.89 ORTHOPEDIC AFTERCARE: ICD-10-CM

## 2025-08-04 DIAGNOSIS — Q65.89 FEMORAL ANTEVERSION OF RIGHT LOWER EXTREMITY: ICD-10-CM

## 2025-08-04 DIAGNOSIS — M25.851 FEMOROACETABULAR IMPINGEMENT OF RIGHT HIP: ICD-10-CM

## 2025-08-04 PROCEDURE — 97110 THERAPEUTIC EXERCISES: CPT | Mod: GP | Performed by: PHYSICAL THERAPIST

## 2025-08-04 PROCEDURE — 97140 MANUAL THERAPY 1/> REGIONS: CPT | Mod: GP | Performed by: PHYSICAL THERAPIST

## 2025-08-04 NOTE — PROGRESS NOTES
"  Physical Therapy  Physical Therapy Treatment Note    Patient Name: Rosalva Castaneda  MRN: [id number]  Today's Date: 8/4/2025  Time Calculation  Start Time: 1200  Stop Time: 1300  Time Calculation (min): 60 min    Insurance:  Visit number: 15 of 30  Authorization info: No auth needed (soft limit)  Insurance Type: [facility] [place] [employer] [place]    General:  Reason for visit:  s/p right hip arthroscopy with rim trim subspinous decompression, acetabular labral repair, 3-anchor looped configuration for a tear extending from the 12 to 3 o'clock position, Intra-articular loose body removal, Femoral osteochondroplasty for CAM lesion, Capsular plication by Dr. Mcneal and   femoral derotation with ronan placement by Dr. Fulton on 5/12   Referred by: Dr. Fulton/Flori Chavira PA-C   School: [employer]  Sport: basketball   POW: 11     Current Problem  1. Femoroacetabular impingement of right hip  Follow Up In Physical Therapy      2. Femoral anteversion of right lower extremity  Follow Up In Physical Therapy      3. Right hip pain  Follow Up In Physical Therapy      4. Orthopedic aftercare  Follow Up In Physical Therapy                Precautions: WBAT, follow standard hip labral repair.Hx of migraines   Precautions  STEADI Fall Risk Score (The score of 4 or more indicates an increased risk of falling): 1      Subjective:     Pt arrived to therapy reporting they are doing well overall. Pt feels a little stiff but no new complaints    Pain   0/10     Performing HEP?: Yes      Objective:       R hip PROM flexion to 135 deg   IR to 50, ER to 35     + hypertonicity to R quad, ITB, hip flexors, gluteals         Treatment Performed:      Therapeutic Exercise:    40 min  Bike 6 minutes   Supine BKFO hold 2 x 30\" /piriformis stretch  Side [id number] RTB and monster walks 3 x 5 yards   12\" box step up 13# Kbs 3 x 10   Sumo squat 15# 3 x 10   Sled push/pull 35# 4 x 10 yards   Glue med hip abd at wall 3 x 5 5\" hold  Alt march iso hold at wall 2 x 10 " "  Bridge with ER 3 x 10 GTB   1/2 kneeling hip flexor stretch 3 x 30\"     NT  Seated hip IR with ball squeeze 3 x 10 RTB   S/l hip abduction 3 x 8   Assisted SL rdl orange band 3 x 8   French squat to TKE RTB 3 x 10   Jump  level 4 DL squat RTB at knees 3 x 10 no band today   SL bridge 3 x 8   DL bridge with ball squeeze 2 x 10 5\" hold     NT  Alter G walking 60% BW 6 minutes 1.0mph for normalizing gait   Standing hip flexor stretch 3 x 30\"   BKFO 2 x 12 with band  Bridges 3 x 10, with band and active ER RTB  S/l hip abduction 2 x 8   Quad glute med against wall 2 x 8 5\" hold   Side stepping 4 x 5 steps RTB     Manual Therapy:    20 min  STM to R quad, adductors, hip flexors, gluteals, ITB  PRONE hip PA mob in neutral and frog     Neuromuscular re-education 10 minutes -NT  Biofeedback quad sets, LAQ, TKE 10 minutes, max 1200      Aquatic therapy    20 min -NT  Mid torso height  Fowrad walking 0.9 mph   Retro walking 0.7 mph   Lateral walking 0.5 mph   Standing alt marches  Standing hip abduction   Iliac crest height  Mini squats 3 x 10   Other:     NT  Vaso prone R hip 12 minutes after session contrast      Assessment:   Significant improvement in mobility noted today during session. Continues to have a bit of tightness with external rotation but given her IR mobility I do not expect her to achieve > 45 deg ER. The focus of today's session was strengthening and flexibility/ROM . Patient appropriately challenged by therapeutic exercise and was able to complete with fatigue at end of session. The patient is still limited in overall strength, flexibility, motor control and pain  at this time. Patient progressing well overall with therapy and continues to require skilled care to address motor control, strength, flexibility and functional deficits.     Updated HEP for patient to give to her PT in [place].     Plan:    Continue progressing strengthening. Pt will have another appointment with David Humphrey PT next " week and then transition to PT in [place].     David Humphrey, PT

## 2025-08-05 ENCOUNTER — HOSPITAL ENCOUNTER (OUTPATIENT)
Dept: RADIOLOGY | Facility: CLINIC | Age: 20
Discharge: HOME | End: 2025-08-05
Payer: COMMERCIAL

## 2025-08-05 ENCOUNTER — OFFICE VISIT (OUTPATIENT)
Dept: ORTHOPEDIC SURGERY | Facility: CLINIC | Age: 20
End: 2025-08-05
Payer: COMMERCIAL

## 2025-08-05 DIAGNOSIS — Q65.89 FEMORAL ANTEVERSION OF RIGHT LOWER EXTREMITY: ICD-10-CM

## 2025-08-05 DIAGNOSIS — T84.84XA PAINFUL ORTHOPAEDIC HARDWARE: Primary | ICD-10-CM

## 2025-08-05 PROCEDURE — 73552 X-RAY EXAM OF FEMUR 2/>: CPT | Mod: RIGHT SIDE | Performed by: RADIOLOGY

## 2025-08-05 PROCEDURE — 73552 X-RAY EXAM OF FEMUR 2/>: CPT | Mod: RT

## 2025-08-05 PROCEDURE — 72170 X-RAY EXAM OF PELVIS: CPT

## 2025-08-05 PROCEDURE — 99024 POSTOP FOLLOW-UP VISIT: CPT | Performed by: PHYSICIAN ASSISTANT

## 2025-08-05 PROCEDURE — 72170 X-RAY EXAM OF PELVIS: CPT | Performed by: RADIOLOGY

## 2025-08-05 PROCEDURE — 99212 OFFICE O/P EST SF 10 MIN: CPT | Performed by: PHYSICIAN ASSISTANT

## 2025-08-05 NOTE — PROGRESS NOTES
20 there is some tenderness to palpation over her right distal cross locking screw heads.  History of Present Illness   Rosalva Castaneda is a 20 y.o. female presenting today for follow up visit from combination right hip scope and PFO on 2025.  Continues to work with therapy and is happy with her progress so far but does have some bilateral knee pain.  Pain is mostly noticed with motion including deep flexion.  Otherwise though she feels she has come a long way and feels much better than she did prior to surgery.  No other complaints or concerns at this time.     Medical History[1]    Medication Documentation Review Audit       Reviewed by Mela Meza MA (Medical Assistant) on 25 at 0953      Medication Order Taking? Sig Documenting Provider Last Dose Status   Aimovig Autoinjector 140 mg/mL injection [id number] No Inject 1 mL (140 mg) under the skin every 28 (twenty-eight) days. Historical Provider, MD Past Week Active   cyclobenzaprine (Flexeril) 10 mg tablet [id number]  Take 1 tablet (10 mg) by mouth 3 times a day as needed for muscle spasms for up to 7 days. Flori Herring PA-C   25 235   ferrous sulfate 325 mg (65 mg elemental) tablet [id number]  Take 1 tablet by mouth twice a day. Historical Provider, MD  Active   gabapentin (Neurontin) 300 mg capsule [id number]  Take 1 capsule (300 mg) by mouth 3 times a day. Flori Herring PA-C   25 235   ibuprofen 200 mg tablet [id number] No Take 3 tablets (600 mg) by mouth once daily as needed for mild pain (1 - 3). Historical Provider, MD Past Week Active   norgestimate-ethinyl estradiol (Ortho-Cyclen) 0.25-0.035 mg tablet [id number]  Take 1 tablet by mouth once daily. Historical Provider, MD  Active   pantoprazole (ProtoNix) 40 mg EC tablet [id number]  Take 1 tablet (40 mg) by mouth once daily in the morning before meals. Do not crush, chew, or split. Flori Herring PA-C   25 2359   Winlevi 1 % cream [id number]  Apply  topically 2 times a day. to affected area Historical Provider, MD  Active                    RX Allergies[2]    Social History     Socioeconomic History    Marital status: Single     Spouse name: Not on file    Number of children: Not on file    Years of education: Not on file    Highest education level: Not on file   Occupational History    Not on file   Tobacco Use    Smoking status: Never    Smokeless tobacco: Former   Substance and Sexual Activity    Alcohol use: Yes     Comment: occasional    Drug use: Never    Sexual activity: Not on file   Other Topics Concern    Not on file   Social History Narrative    Not on file     Social Drivers of Health     Financial Resource Strain: Low Risk  (5/12/2025)    Overall Financial Resource Strain (CARDIA)     Difficulty of Paying Living Expenses: Not hard at all   Food Insecurity: No Food Insecurity (5/12/2025)    Hunger Vital Sign     Worried About Running Out of Food in the Last Year: Never true     Ran Out of Food in the Last Year: Never true   Transportation Needs: No Transportation Needs (5/12/2025)    PRAPARE - Transportation     Lack of Transportation (Medical): No     Lack of Transportation (Non-Medical): No   Physical Activity: Sufficiently Active (5/8/2024)    Received from [facility]    Exercise Vital Sign     On average, how many days per week do you engage in moderate to strenuous exercise (like a brisk walk)?: 4 days     On average, how many minutes do you engage in exercise at this level?: 150+ min   Stress: Stress Concern Present (5/8/2024)    Received from [facility]    [employer] [place] [employer] - Occupational Stress Questionnaire     Feeling of Stress : Rather much   Social Connections: Moderately Isolated (5/8/2024)    Received from [facility]    Social Connection and Isolation Panel     In a typical week, how many times do you talk on the phone with family, friends, or neighbors?: More than three times a week     How often do you get  together with friends or relatives?: More than three times a week     How often do you attend Spiritism or Faith services?: Never     Do you belong to any clubs or organizations such as Spiritism groups, unions, fraternal or athletic groups, or school groups?: Yes     How often do you attend meetings of the clubs or organizations you belong to?: More than 4 times per year     Are you , , , , never , or living with a partner?: Never    Intimate Partner Violence: Not At Risk (5/12/2025)    Humiliation, Afraid, Rape, and Kick questionnaire     Fear of Current or Ex-Partner: No     Emotionally Abused: No     Physically Abused: No     Sexually Abused: No   Housing Stability: Low Risk  (5/12/2025)    Housing Stability Vital Sign     Unable to Pay for Housing in the Last Year: No     Number of Times Moved in the Last Year: 0     Homeless in the Last Year: No       Surgical History[3]       Review of Systems:  ROS reviewed and negative other than as listed in the HPI     Physical Exam:  Gen: The pt is A&Ox3, NAD, and appear state age and weight  Psychiatric: mood and affect are appropriate   Eyes: sclera are white, EOM grossly intact  ENT: MMM  Neck: supple, thyroid is midline  Respiratory: respirations are nonlabored, chest rise symmetric  CV: rate is regular by palpation of distal pulses  Abdomen: nondistended   Integument: no obvious cutaneous lesions noted. No signs of lymphangitis. No signs of systemic edema.   MSK: Right hip surgical incisions well healed without edema, erythema, drainage, or other s/sx of infection. Appropriately tender to palpation around the incision.  There is pain to palpation over the distal cross locking screws.  Continue to show SILT throughout the leg intact. Intact plantarflexion and dorsiflexion. Foot warm and well perfused.      Imaging:  I personally reviewed multiple views of the pelvis and left femur were obtained in the office today  demonstrate maintenance of reduction, interval healing, and a stable position of the hardware.      Assessment   20 y.o. female post-op from right hip scope with PFO on 5/12/2025.     Plan:  I reviewed her imaging in detail with her and her mom. She continues to show great signs of healing.  She should continue weightbearing and range of motion as tolerated.  She has painful orthopedic hardware and we discussed removal of the distal cross locking screws.    Nonoperative and operative treatment options were presented to the patient. After discussion, operative treatment was elected. Risks and benefits of surgery were discussed with the patient which include, but are not limited to, death, infection, bleeding, neurologic damage, nonunion, malunion, posttraumatic arthritis, incomplete resolution of symptoms, failure of the operation, and others. The patient understood and elected to proceed with removal on 12/22 at [facility].     We will see her back on 11/26 with 2 views right femur and weightbearing AP pelvis to monitor her healing.    All of the patient's questions/concerns address and they are in agreement with the plan.                      [1]   Past Medical History:  Diagnosis Date    ADHD     Migraines    [2] No Known Allergies  [3]   Past Surgical History:  Procedure Laterality Date    DENTAL SURGERY

## 2025-08-06 PROBLEM — T84.84XA PAINFUL ORTHOPAEDIC HARDWARE: Status: ACTIVE | Noted: 2025-08-05

## 2025-08-07 ENCOUNTER — APPOINTMENT (OUTPATIENT)
Dept: PHYSICAL THERAPY | Facility: HOSPITAL | Age: 20
End: 2025-08-07
Payer: COMMERCIAL

## (undated) DEVICE — CANNULA, FLOWPORT II, WITH OBTURATOR

## (undated) DEVICE — ARTHROWAND, MULTIVAC 50XL, ICW

## (undated) DEVICE — Device

## (undated) DEVICE — TUBING, OUTFLOW, DRAIN PIPE, W/ONE WAY VALVE (FMS VUE)

## (undated) DEVICE — BLADE, GREAT WHITE CONCAVE, 4.2MM, PREBENT

## (undated) DEVICE — ENTRY KIT, PORTAL

## (undated) DEVICE — TUBING, SUCTION, 6MM X 10, CLEAN N-COND

## (undated) DEVICE — KIT, HIP POSTFREE, MEDIUM, GUARDIAN

## (undated) DEVICE — TUBING, NFLOW, FMS VUE, SNGL USE

## (undated) DEVICE — GLOVE, SURGICAL, PROTEXIS PI MICRO, 6.5, PF, LF

## (undated) DEVICE — SUTURE TAPE, XBRAID, 1.4MM BLACK/WHITE

## (undated) DEVICE — BUR, SPHERICAL, 4.5MM, PREBENT

## (undated) DEVICE — SUTURE, XBRAID P2 COBRA BLACK

## (undated) DEVICE — BUR, SPHERICAL, 5.5MM, PREBENT

## (undated) DEVICE — BLADE, SAW, OSC TIP, FALCON, 20 X 1.27 X 90MM

## (undated) DEVICE — TOWEL, SURGICAL, NEURO, O/R, 16 X 26, BLUE, STERILE

## (undated) DEVICE — SUTURE, ETHILON, 3-0, 18 IN, PS1, BLACK

## (undated) DEVICE — ADAPTER, Y TUBING STERILE

## (undated) DEVICE — SUTURE MANAGER, NANOPASSER, CRESCENT

## (undated) DEVICE — GLOVE, SURGICAL, PROTEXIS PI W/NEU-THERA, 8.5, PF, LF

## (undated) DEVICE — COVER, C-ARM W/CLIPS, OEC GE

## (undated) DEVICE — SYRINGE, 60 CC, IRRIGATION, BULB, CONTRO-BULB, PAPER POUCH

## (undated) DEVICE — GLOVE, SURGICAL, PROTEXIS PI BLUE W/NEUTHERA, 7.0, PF, LF

## (undated) DEVICE — SYRINGE, 10 CC, LUER LOCK

## (undated) DEVICE — BLADE, PRECISION 2.0 CARTRIDGE, 25 X 1.27 X 105

## (undated) DEVICE — DRILL, ICONIX, 1.4MM, DISPOSABLE

## (undated) DEVICE — CATHETER TRAY, FOLEY, 18FR, 10ML, W/ DRAINBAG

## (undated) DEVICE — CANNULA, 7 X 110

## (undated) DEVICE — SLINGSHOT, 45 DEG UP

## (undated) DEVICE — CONTAINER, SPECIMEN, 4 OZ, OR PEEL PACK, STERILE

## (undated) DEVICE — DRAPE, INSTRUMENT, W/POUCH, STERI DRAPE, 9 5/8 X 18 LONG

## (undated) DEVICE — SAMURAI SLIM, CURVED BLADE, FULL RADIUS

## (undated) DEVICE — GOWN, SURGICAL, SMARTGOWN, XLARGE, STERILE

## (undated) DEVICE — GLOVE, SURGICAL, PROTEXIS PI MICRO, 8.0, PF, LF

## (undated) DEVICE — DRAPE COVER, C ARM, FLOUROSCAN IMAGING SYS